# Patient Record
Sex: MALE | Race: OTHER | NOT HISPANIC OR LATINO | URBAN - METROPOLITAN AREA
[De-identification: names, ages, dates, MRNs, and addresses within clinical notes are randomized per-mention and may not be internally consistent; named-entity substitution may affect disease eponyms.]

---

## 2019-03-22 ENCOUNTER — EMERGENCY (EMERGENCY)
Facility: HOSPITAL | Age: 64
LOS: 1 days | Discharge: HOME | End: 2019-03-22
Attending: EMERGENCY MEDICINE | Admitting: EMERGENCY MEDICINE
Payer: MEDICAID

## 2019-03-22 VITALS
SYSTOLIC BLOOD PRESSURE: 153 MMHG | OXYGEN SATURATION: 99 % | RESPIRATION RATE: 16 BRPM | HEART RATE: 98 BPM | DIASTOLIC BLOOD PRESSURE: 71 MMHG | TEMPERATURE: 99 F

## 2019-03-22 VITALS
HEART RATE: 112 BPM | TEMPERATURE: 102 F | SYSTOLIC BLOOD PRESSURE: 120 MMHG | RESPIRATION RATE: 22 BRPM | DIASTOLIC BLOOD PRESSURE: 72 MMHG | OXYGEN SATURATION: 96 %

## 2019-03-22 LAB
ALBUMIN SERPL ELPH-MCNC: 4.3 G/DL — SIGNIFICANT CHANGE UP (ref 3.3–5)
ALP SERPL-CCNC: 112 U/L — SIGNIFICANT CHANGE UP (ref 40–120)
ALT FLD-CCNC: 46 U/L — HIGH (ref 4–41)
ANION GAP SERPL CALC-SCNC: 14 MMO/L — SIGNIFICANT CHANGE UP (ref 7–14)
APPEARANCE UR: SIGNIFICANT CHANGE UP
AST SERPL-CCNC: 36 U/L — SIGNIFICANT CHANGE UP (ref 4–40)
B PERT DNA SPEC QL NAA+PROBE: NOT DETECTED — SIGNIFICANT CHANGE UP
BACTERIA # UR AUTO: SIGNIFICANT CHANGE UP
BASE EXCESS BLDV CALC-SCNC: 3.4 MMOL/L — SIGNIFICANT CHANGE UP
BASOPHILS # BLD AUTO: 0.04 K/UL — SIGNIFICANT CHANGE UP (ref 0–0.2)
BASOPHILS NFR BLD AUTO: 0.6 % — SIGNIFICANT CHANGE UP (ref 0–2)
BILIRUB SERPL-MCNC: 0.5 MG/DL — SIGNIFICANT CHANGE UP (ref 0.2–1.2)
BILIRUB UR-MCNC: NEGATIVE — SIGNIFICANT CHANGE UP
BLOOD GAS VENOUS - CREATININE: 0.73 MG/DL — SIGNIFICANT CHANGE UP (ref 0.5–1.3)
BLOOD UR QL VISUAL: NEGATIVE — SIGNIFICANT CHANGE UP
BUN SERPL-MCNC: 9 MG/DL — SIGNIFICANT CHANGE UP (ref 7–23)
C PNEUM DNA SPEC QL NAA+PROBE: NOT DETECTED — SIGNIFICANT CHANGE UP
CALCIUM SERPL-MCNC: 9.6 MG/DL — SIGNIFICANT CHANGE UP (ref 8.4–10.5)
CHLORIDE BLDV-SCNC: 100 MMOL/L — SIGNIFICANT CHANGE UP (ref 96–108)
CHLORIDE SERPL-SCNC: 99 MMOL/L — SIGNIFICANT CHANGE UP (ref 98–107)
CO2 SERPL-SCNC: 24 MMOL/L — SIGNIFICANT CHANGE UP (ref 22–31)
COLOR SPEC: YELLOW — SIGNIFICANT CHANGE UP
CREAT SERPL-MCNC: 0.75 MG/DL — SIGNIFICANT CHANGE UP (ref 0.5–1.3)
EOSINOPHIL # BLD AUTO: 0.05 K/UL — SIGNIFICANT CHANGE UP (ref 0–0.5)
EOSINOPHIL NFR BLD AUTO: 0.7 % — SIGNIFICANT CHANGE UP (ref 0–6)
EPI CELLS # UR: SIGNIFICANT CHANGE UP
FLUAV H1 2009 PAND RNA SPEC QL NAA+PROBE: NOT DETECTED — SIGNIFICANT CHANGE UP
FLUAV H1 RNA SPEC QL NAA+PROBE: NOT DETECTED — SIGNIFICANT CHANGE UP
FLUAV H3 RNA SPEC QL NAA+PROBE: DETECTED — HIGH
FLUBV RNA SPEC QL NAA+PROBE: NOT DETECTED — SIGNIFICANT CHANGE UP
GAS PNL BLDV: 137 MMOL/L — SIGNIFICANT CHANGE UP (ref 136–146)
GLUCOSE BLDV-MCNC: 273 — HIGH (ref 70–99)
GLUCOSE SERPL-MCNC: 256 MG/DL — HIGH (ref 70–99)
GLUCOSE UR-MCNC: 300 — HIGH
HADV DNA SPEC QL NAA+PROBE: NOT DETECTED — SIGNIFICANT CHANGE UP
HCO3 BLDV-SCNC: 26 MMOL/L — SIGNIFICANT CHANGE UP (ref 20–27)
HCOV PNL SPEC NAA+PROBE: SIGNIFICANT CHANGE UP
HCT VFR BLD CALC: 43.4 % — SIGNIFICANT CHANGE UP (ref 39–50)
HCT VFR BLDV CALC: 44.1 % — SIGNIFICANT CHANGE UP (ref 39–51)
HGB BLD-MCNC: 13.9 G/DL — SIGNIFICANT CHANGE UP (ref 13–17)
HGB BLDV-MCNC: 14.4 G/DL — SIGNIFICANT CHANGE UP (ref 13–17)
HMPV RNA SPEC QL NAA+PROBE: NOT DETECTED — SIGNIFICANT CHANGE UP
HPIV1 RNA SPEC QL NAA+PROBE: NOT DETECTED — SIGNIFICANT CHANGE UP
HPIV2 RNA SPEC QL NAA+PROBE: NOT DETECTED — SIGNIFICANT CHANGE UP
HPIV3 RNA SPEC QL NAA+PROBE: NOT DETECTED — SIGNIFICANT CHANGE UP
HPIV4 RNA SPEC QL NAA+PROBE: NOT DETECTED — SIGNIFICANT CHANGE UP
IMM GRANULOCYTES NFR BLD AUTO: 0.9 % — SIGNIFICANT CHANGE UP (ref 0–1.5)
KETONES UR-MCNC: NEGATIVE — SIGNIFICANT CHANGE UP
LACTATE BLDV-MCNC: 1.8 MMOL/L — SIGNIFICANT CHANGE UP (ref 0.5–2)
LEUKOCYTE ESTERASE UR-ACNC: SIGNIFICANT CHANGE UP
LYMPHOCYTES # BLD AUTO: 0.97 K/UL — LOW (ref 1–3.3)
LYMPHOCYTES # BLD AUTO: 14.1 % — SIGNIFICANT CHANGE UP (ref 13–44)
MAGNESIUM SERPL-MCNC: 1.4 MG/DL — LOW (ref 1.6–2.6)
MCHC RBC-ENTMCNC: 25.5 PG — LOW (ref 27–34)
MCHC RBC-ENTMCNC: 32 % — SIGNIFICANT CHANGE UP (ref 32–36)
MCV RBC AUTO: 79.6 FL — LOW (ref 80–100)
MONOCYTES # BLD AUTO: 0.53 K/UL — SIGNIFICANT CHANGE UP (ref 0–0.9)
MONOCYTES NFR BLD AUTO: 7.7 % — SIGNIFICANT CHANGE UP (ref 2–14)
MUCOUS THREADS # UR AUTO: SIGNIFICANT CHANGE UP
NEUTROPHILS # BLD AUTO: 5.21 K/UL — SIGNIFICANT CHANGE UP (ref 1.8–7.4)
NEUTROPHILS NFR BLD AUTO: 76 % — SIGNIFICANT CHANGE UP (ref 43–77)
NITRITE UR-MCNC: NEGATIVE — SIGNIFICANT CHANGE UP
NRBC # FLD: 0 K/UL — LOW (ref 25–125)
PCO2 BLDV: 48 MMHG — SIGNIFICANT CHANGE UP (ref 41–51)
PH BLDV: 7.38 PH — SIGNIFICANT CHANGE UP (ref 7.32–7.43)
PH UR: 6.5 — SIGNIFICANT CHANGE UP (ref 5–8)
PHOSPHATE SERPL-MCNC: 3.2 MG/DL — SIGNIFICANT CHANGE UP (ref 2.5–4.5)
PLATELET # BLD AUTO: 238 K/UL — SIGNIFICANT CHANGE UP (ref 150–400)
PMV BLD: 11.8 FL — SIGNIFICANT CHANGE UP (ref 7–13)
PO2 BLDV: 30 MMHG — LOW (ref 35–40)
POTASSIUM BLDV-SCNC: 3.7 MMOL/L — SIGNIFICANT CHANGE UP (ref 3.4–4.5)
POTASSIUM SERPL-MCNC: 4 MMOL/L — SIGNIFICANT CHANGE UP (ref 3.5–5.3)
POTASSIUM SERPL-SCNC: 4 MMOL/L — SIGNIFICANT CHANGE UP (ref 3.5–5.3)
PROT SERPL-MCNC: 7.5 G/DL — SIGNIFICANT CHANGE UP (ref 6–8.3)
PROT UR-MCNC: SIGNIFICANT CHANGE UP
RBC # BLD: 5.45 M/UL — SIGNIFICANT CHANGE UP (ref 4.2–5.8)
RBC # FLD: 13.5 % — SIGNIFICANT CHANGE UP (ref 10.3–14.5)
RSV RNA SPEC QL NAA+PROBE: NOT DETECTED — SIGNIFICANT CHANGE UP
RV+EV RNA SPEC QL NAA+PROBE: DETECTED — HIGH
SAO2 % BLDV: 52.4 % — LOW (ref 60–85)
SODIUM SERPL-SCNC: 137 MMOL/L — SIGNIFICANT CHANGE UP (ref 135–145)
SP GR SPEC: 1.02 — SIGNIFICANT CHANGE UP (ref 1–1.04)
UROBILINOGEN FLD QL: NORMAL — SIGNIFICANT CHANGE UP
WBC # BLD: 6.86 K/UL — SIGNIFICANT CHANGE UP (ref 3.8–10.5)
WBC # FLD AUTO: 6.86 K/UL — SIGNIFICANT CHANGE UP (ref 3.8–10.5)
WBC UR QL: SIGNIFICANT CHANGE UP (ref 0–?)

## 2019-03-22 PROCEDURE — 71046 X-RAY EXAM CHEST 2 VIEWS: CPT | Mod: 26

## 2019-03-22 PROCEDURE — 74177 CT ABD & PELVIS W/CONTRAST: CPT | Mod: 26

## 2019-03-22 PROCEDURE — 99284 EMERGENCY DEPT VISIT MOD MDM: CPT

## 2019-03-22 RX ORDER — ACETAMINOPHEN 500 MG
975 TABLET ORAL ONCE
Qty: 0 | Refills: 0 | Status: COMPLETED | OUTPATIENT
Start: 2019-03-22 | End: 2019-03-22

## 2019-03-22 RX ORDER — CEFTRIAXONE 500 MG/1
1 INJECTION, POWDER, FOR SOLUTION INTRAMUSCULAR; INTRAVENOUS ONCE
Qty: 0 | Refills: 0 | Status: COMPLETED | OUTPATIENT
Start: 2019-03-22 | End: 2019-03-22

## 2019-03-22 RX ORDER — SODIUM CHLORIDE 9 MG/ML
2000 INJECTION, SOLUTION INTRAVENOUS ONCE
Qty: 0 | Refills: 0 | Status: COMPLETED | OUTPATIENT
Start: 2019-03-22 | End: 2019-03-22

## 2019-03-22 RX ORDER — AZITHROMYCIN 500 MG/1
500 TABLET, FILM COATED ORAL ONCE
Qty: 0 | Refills: 0 | Status: COMPLETED | OUTPATIENT
Start: 2019-03-22 | End: 2019-03-22

## 2019-03-22 RX ORDER — CLARITHROMYCIN 500 MG
1 TABLET ORAL
Qty: 8 | Refills: 0
Start: 2019-03-22 | End: 2019-03-25

## 2019-03-22 RX ADMIN — AZITHROMYCIN 250 MILLIGRAM(S): 500 TABLET, FILM COATED ORAL at 13:47

## 2019-03-22 RX ADMIN — SODIUM CHLORIDE 2000 MILLILITER(S): 9 INJECTION, SOLUTION INTRAVENOUS at 13:15

## 2019-03-22 RX ADMIN — CEFTRIAXONE 100 GRAM(S): 500 INJECTION, POWDER, FOR SOLUTION INTRAMUSCULAR; INTRAVENOUS at 13:15

## 2019-03-22 RX ADMIN — Medication 975 MILLIGRAM(S): at 14:19

## 2019-03-22 RX ADMIN — Medication 75 MILLIGRAM(S): at 17:49

## 2019-03-22 NOTE — ED PROVIDER NOTE - NSFOLLOWUPINSTRUCTIONS_ED_ALL_ED_FT
- You came to the hospital with fever, tachycardia (fast heart rate) and abdominal pain. You were found to have Influenza A virus. You also have Entero/Rhinovirus which are other respiratory viral infections.   - You had a CT scan of your abdomen and it did not show any concerning findings. You have diverticulosis (pockets in the colon) and you should eat fiber rich foods to avoid further diverticulosis. You have 2 3 mm nodules in both lungs which should be followed up by your primary care doctor.     1) Please take tamiflu (oseltamivir) 75 mg twice daily for 5 days total. This will help with your flu symptoms.  2) Please take azithromycin 250 mg daily for 4 days. You already received a dose of azithromycin in the ED. This is an antibiotic that will treat organisms that can cause pneumonia (although our suspicion for pneumonia is low).   3) Please follow up with your primary care doctor within 7 days of discharge.  4) Please drink plenty of fluids and get adequate rest.

## 2019-03-22 NOTE — ED PROVIDER NOTE - OBJECTIVE STATEMENT
63M with DMII and CAD s/p stent, presenting with subjective fever, nonproductive cough x 5 days, and generalized weakness. Also reporting watery diarrhea ~3 episodes per day for past 5 days, associated with lower abdominal pain. Pt states he just arrived from [place] last night. No sick contacts. No nausea or vomiting. No chest pain or dyspnea. No urinary symptoms. No bleeding in urine or stool. 63M with DMII and CAD s/p stent, presenting with subjective fever, nonproductive cough x 5 days, and generalized weakness. Also reporting watery diarrhea ~3 episodes per day for past 5 days, associated with lower abdominal pain. Pt states he just arrived from [place] last night. No sick contacts. No nausea or vomiting. No chest pain or dyspnea. No urinary symptoms. No bleeding in urine or stool.   Lizbet att: 63M h/o niddm, cad 1 stent p/w f, cough, malaise x 5 days. Patient returned from [place] last night but for past five days notes subj fever, malaise, non productive cough, watery non bloody diarrhea. NOtes some discomfort, right lower abdomen. Denies cp, dyspnea on exertion, dysuria. NKDA

## 2019-03-22 NOTE — ED ADULT NURSE NOTE - NSIMPLEMENTINTERV_GEN_ALL_ED
Implemented All Universal Safety Interventions:  [place] to call system. Call bell, personal items and telephone within reach. Instruct patient to call for assistance. Room bathroom lighting operational. Non-slip footwear when patient is off stretcher. Physically safe environment: no spills, clutter or unnecessary equipment. Stretcher in lowest position, wheels locked, appropriate side rails in place.

## 2019-03-22 NOTE — ED PROVIDER NOTE - CLINICAL SUMMARY MEDICAL DECISION MAKING FREE TEXT BOX
63M DMII and CAD, p/w fever and tachycardia, suspected pulmonary vs. abdominal source, meeting sepsis criteria.  2L IVF bolus, will send CBC, CMP, vbg with lactate, blood and urine cultures, RVP, will treat empirically with ceftriaxone and azithromycin (will cover for CAP as well as possible UTI)  Check CT abdomen/pelvis as pt with RLQ tenderness on exam.

## 2019-03-22 NOTE — ED ADULT NURSE NOTE - OBJECTIVE STATEMENT
Pt returning from [place] last night c/o dry cough x 5 days received to rm 23 aaox4 ambulatory Pt denies chest pain SOB headache dizziness NVD abdominal pain.  Pt p/W NAD breaths even unlabored dry cough intermittent skin warm dry intact fever noted.  18 g IV access obtained at R. labs fluids and abx as ordered will monitor.

## 2019-03-22 NOTE — ED ADULT TRIAGE NOTE - CHIEF COMPLAINT QUOTE
arrived from [place] last night. c/o cough x 5 days no productive sputum, denies chest pain. hx of DM and stent in 2012. no distress at rest. Febrile and tachy.

## 2019-03-23 LAB
BACTERIA UR CULT: SIGNIFICANT CHANGE UP
SPECIMEN SOURCE: SIGNIFICANT CHANGE UP

## 2019-03-27 LAB
BACTERIA BLD CULT: SIGNIFICANT CHANGE UP
BACTERIA BLD CULT: SIGNIFICANT CHANGE UP

## 2019-08-01 ENCOUNTER — OUTPATIENT (OUTPATIENT)
Dept: OUTPATIENT SERVICES | Facility: HOSPITAL | Age: 64
LOS: 1 days | End: 2019-08-01
Payer: MEDICAID

## 2019-08-01 PROCEDURE — G9001: CPT

## 2019-08-05 ENCOUNTER — INPATIENT (INPATIENT)
Facility: HOSPITAL | Age: 64
LOS: 2 days | Discharge: HOME HEALTH | End: 2019-08-08
Attending: INTERNAL MEDICINE | Admitting: INTERNAL MEDICINE
Payer: MEDICAID

## 2019-08-05 VITALS
SYSTOLIC BLOOD PRESSURE: 133 MMHG | RESPIRATION RATE: 16 BRPM | HEART RATE: 76 BPM | DIASTOLIC BLOOD PRESSURE: 77 MMHG | TEMPERATURE: 98 F | OXYGEN SATURATION: 100 %

## 2019-08-05 DIAGNOSIS — R07.9 CHEST PAIN, UNSPECIFIED: ICD-10-CM

## 2019-08-05 LAB
ALBUMIN SERPL ELPH-MCNC: 4.3 G/DL — SIGNIFICANT CHANGE UP (ref 3.3–5)
ALP SERPL-CCNC: 99 U/L — SIGNIFICANT CHANGE UP (ref 40–120)
ALT FLD-CCNC: 40 U/L — SIGNIFICANT CHANGE UP (ref 4–41)
ANION GAP SERPL CALC-SCNC: 11 MMO/L — SIGNIFICANT CHANGE UP (ref 7–14)
APTT BLD: 28.6 SEC — SIGNIFICANT CHANGE UP (ref 27.5–36.3)
AST SERPL-CCNC: 24 U/L — SIGNIFICANT CHANGE UP (ref 4–40)
BASOPHILS # BLD AUTO: 0.09 K/UL — SIGNIFICANT CHANGE UP (ref 0–0.2)
BASOPHILS NFR BLD AUTO: 1 % — SIGNIFICANT CHANGE UP (ref 0–2)
BILIRUB SERPL-MCNC: 0.3 MG/DL — SIGNIFICANT CHANGE UP (ref 0.2–1.2)
BUN SERPL-MCNC: 18 MG/DL — SIGNIFICANT CHANGE UP (ref 7–23)
CALCIUM SERPL-MCNC: 9.8 MG/DL — SIGNIFICANT CHANGE UP (ref 8.4–10.5)
CHLORIDE SERPL-SCNC: 98 MMOL/L — SIGNIFICANT CHANGE UP (ref 98–107)
CO2 SERPL-SCNC: 26 MMOL/L — SIGNIFICANT CHANGE UP (ref 22–31)
CREAT SERPL-MCNC: 0.73 MG/DL — SIGNIFICANT CHANGE UP (ref 0.5–1.3)
EOSINOPHIL # BLD AUTO: 0.21 K/UL — SIGNIFICANT CHANGE UP (ref 0–0.5)
EOSINOPHIL NFR BLD AUTO: 2.3 % — SIGNIFICANT CHANGE UP (ref 0–6)
GLUCOSE SERPL-MCNC: 291 MG/DL — HIGH (ref 70–99)
HCT VFR BLD CALC: 41.5 % — SIGNIFICANT CHANGE UP (ref 39–50)
HGB BLD-MCNC: 13.4 G/DL — SIGNIFICANT CHANGE UP (ref 13–17)
IMM GRANULOCYTES NFR BLD AUTO: 1.2 % — SIGNIFICANT CHANGE UP (ref 0–1.5)
INR BLD: 1.03 — SIGNIFICANT CHANGE UP (ref 0.88–1.17)
LYMPHOCYTES # BLD AUTO: 3.03 K/UL — SIGNIFICANT CHANGE UP (ref 1–3.3)
LYMPHOCYTES # BLD AUTO: 33 % — SIGNIFICANT CHANGE UP (ref 13–44)
MCHC RBC-ENTMCNC: 25.7 PG — LOW (ref 27–34)
MCHC RBC-ENTMCNC: 32.3 % — SIGNIFICANT CHANGE UP (ref 32–36)
MCV RBC AUTO: 79.5 FL — LOW (ref 80–100)
MONOCYTES # BLD AUTO: 0.63 K/UL — SIGNIFICANT CHANGE UP (ref 0–0.9)
MONOCYTES NFR BLD AUTO: 6.9 % — SIGNIFICANT CHANGE UP (ref 2–14)
NEUTROPHILS # BLD AUTO: 5.1 K/UL — SIGNIFICANT CHANGE UP (ref 1.8–7.4)
NEUTROPHILS NFR BLD AUTO: 55.6 % — SIGNIFICANT CHANGE UP (ref 43–77)
NRBC # FLD: 0 K/UL — SIGNIFICANT CHANGE UP (ref 0–0)
PLATELET # BLD AUTO: 224 K/UL — SIGNIFICANT CHANGE UP (ref 150–400)
PMV BLD: 10.7 FL — SIGNIFICANT CHANGE UP (ref 7–13)
POTASSIUM SERPL-MCNC: 4.5 MMOL/L — SIGNIFICANT CHANGE UP (ref 3.5–5.3)
POTASSIUM SERPL-SCNC: 4.5 MMOL/L — SIGNIFICANT CHANGE UP (ref 3.5–5.3)
PROT SERPL-MCNC: 7.5 G/DL — SIGNIFICANT CHANGE UP (ref 6–8.3)
PROTHROM AB SERPL-ACNC: 11.8 SEC — SIGNIFICANT CHANGE UP (ref 9.8–13.1)
RBC # BLD: 5.22 M/UL — SIGNIFICANT CHANGE UP (ref 4.2–5.8)
RBC # FLD: 13.4 % — SIGNIFICANT CHANGE UP (ref 10.3–14.5)
SODIUM SERPL-SCNC: 135 MMOL/L — SIGNIFICANT CHANGE UP (ref 135–145)
TROPONIN T, HIGH SENSITIVITY: 7 NG/L — SIGNIFICANT CHANGE UP (ref ?–14)
TROPONIN T, HIGH SENSITIVITY: 8 NG/L — SIGNIFICANT CHANGE UP (ref ?–14)
WBC # BLD: 9.17 K/UL — SIGNIFICANT CHANGE UP (ref 3.8–10.5)
WBC # FLD AUTO: 9.17 K/UL — SIGNIFICANT CHANGE UP (ref 3.8–10.5)

## 2019-08-05 PROCEDURE — 71046 X-RAY EXAM CHEST 2 VIEWS: CPT | Mod: 26

## 2019-08-05 RX ORDER — ASPIRIN/CALCIUM CARB/MAGNESIUM 324 MG
325 TABLET ORAL ONCE
Refills: 0 | Status: COMPLETED | OUTPATIENT
Start: 2019-08-05 | End: 2019-08-05

## 2019-08-05 RX ORDER — NITROGLYCERIN 6.5 MG
0.4 CAPSULE, EXTENDED RELEASE ORAL ONCE
Refills: 0 | Status: COMPLETED | OUTPATIENT
Start: 2019-08-05 | End: 2019-08-05

## 2019-08-05 RX ADMIN — Medication 0.4 MILLIGRAM(S): at 21:39

## 2019-08-05 RX ADMIN — Medication 325 MILLIGRAM(S): at 19:59

## 2019-08-05 NOTE — ED ADULT NURSE NOTE - OBJECTIVE STATEMENT
64 year old male with PMH of cardiac stents on Plavix presents with chest pain x 5 hours   EKG done, PIV placed labs drawn and sent plan of care discussed with pt and daughter   will continue to monitor

## 2019-08-05 NOTE — ED ADULT NURSE NOTE - NSIMPLEMENTINTERV_GEN_ALL_ED
Implemented All Fall Risk Interventions:  [place] to call system. Call bell, personal items and telephone within reach. Instruct patient to call for assistance. Room bathroom lighting operational. Non-slip footwear when patient is off stretcher. Physically safe environment: no spills, clutter or unnecessary equipment. Stretcher in lowest position, wheels locked, appropriate side rails in place. Provide visual cue, wrist band, yellow gown, etc. Monitor gait and stability. Monitor for mental status changes and reorient to person, place, and time. Review medications for side effects contributing to fall risk. Reinforce activity limits and safety measures with patient and family.

## 2019-08-05 NOTE — ED PROVIDER NOTE - CLINICAL SUMMARY MEDICAL DECISION MAKING FREE TEXT BOX
pt w/ pmh DMII , CAD s/p stent w/ sudd onset CP waking from sleep left-sided lasting 3-4 hours ekg abnormal tropes 8 will rpt ekg, rpt tropes will contact pts, admit for high risk CP.

## 2019-08-05 NOTE — ED ADULT NURSE REASSESSMENT NOTE - NS ED NURSE REASSESS COMMENT FT1
Handoff report received from PONCHO Oliva. Pt resting on stretcher, c/o of intermittent chest pain to left side of chest. MD made aware. Pt denies SOB, n/v/d, abdominal pain, fever, chills. Respirations even and unlabored, NAD, sinus rhythm on cardiac monitor. Trop repeated and sent. Will continue to monitor

## 2019-08-05 NOTE — ED PROVIDER NOTE - NS ED ROS FT
Gen: Denies fever, weight loss  CV: Denies endorses chest pain, no palpitations   Skin: Denies rash, erythema, color changes  Resp: Denies SOB, cough  Endo: Denies sensitivity to heat, cold, increased urination  GI: Denies constipation, nausea, vomiting  Msk: Denies back pain, LE swelling, extremity pain  : Denies dysuria, increased frequency  Neuro: Denies LOC, weakness, seizures  Psych: Denies hx of psych, hallucinations

## 2019-08-05 NOTE — ED PROVIDER NOTE - PHYSICAL EXAMINATION
Gen: WDWN, NAD  HEENT: EOMI, no nasal discharge, mucous membranes moist  CV: RRR, +S1/S2, no M/R/G  Resp: CTAB, no W/R/R  GI: Abdomen soft non-distended, NTTP, no masses  MSK: No open wounds, no bruising, no LE edema  Neuro: A&Ox4, following commands, moving all four extremities spontaneously  Psych: appropriate mood, denies AH, VH, SI

## 2019-08-05 NOTE — ED PROVIDER NOTE - ATTENDING CONTRIBUTION TO CARE
I performed a face to face bedside interview with patient regarding history of present illness, review of symptoms and past medical history. I completed an independent physical exam.  I have discussed patient's plan of care.   I agree with note as stated above, having amended the EMR as needed to reflect my findings. I have discussed the assessment and plan of care.  This includes during the time I functioned as the attending physician for this patient.  Attending Contribution to Care: agree with plan of resident.  DMII and CAD s/p stent here for chest pain. Reports pain woke pt this afternoon approx 5 hours ago, left sided non radiating non-exertional sudden onset burning pain lasted approx 3-4 hours, now resolved. pmh DMII , CAD s/p stent w/ sudd onset CP waking from sleep left-sided lasting 3-4 hours ekg abnormal tropes 8 will rpt ekg, rpt tropes will contact pts, admit for high risk CP. no need for urgent cath at this point.

## 2019-08-05 NOTE — ED PROVIDER NOTE - OBJECTIVE STATEMENT
64M w/ PMH DMII and CAD s/p stent here for chest pain. Reports pain woke pt this afternoon approx 5 hours ago, left sided non radiating burning pain lasted approx 3-4 hours, now resolved. No dyspnea, no SOB, no abdominal pain, no diaphoresis 64M w/ PMH DMII and CAD s/p stent here for chest pain. Reports pain woke pt this afternoon approx 5 hours ago, left sided non radiating non-exertional sudden onset burning pain lasted approx 3-4 hours, now resolved. No dyspnea, no SOB, no abdominal pain, no diaphoresis, no meds taken at home.     No fever, sweats, recent weight loss, Abd pain, no drug use, fam hx of CAD.

## 2019-08-06 ENCOUNTER — TRANSCRIPTION ENCOUNTER (OUTPATIENT)
Age: 64
End: 2019-08-06

## 2019-08-06 DIAGNOSIS — I10 ESSENTIAL (PRIMARY) HYPERTENSION: ICD-10-CM

## 2019-08-06 DIAGNOSIS — Z29.9 ENCOUNTER FOR PROPHYLACTIC MEASURES, UNSPECIFIED: ICD-10-CM

## 2019-08-06 DIAGNOSIS — I25.10 ATHEROSCLEROTIC HEART DISEASE OF NATIVE CORONARY ARTERY WITHOUT ANGINA PECTORIS: ICD-10-CM

## 2019-08-06 DIAGNOSIS — E11.9 TYPE 2 DIABETES MELLITUS WITHOUT COMPLICATIONS: ICD-10-CM

## 2019-08-06 DIAGNOSIS — R07.9 CHEST PAIN, UNSPECIFIED: ICD-10-CM

## 2019-08-06 LAB
CK MB BLD-MCNC: 1.92 NG/ML — SIGNIFICANT CHANGE UP (ref 1–6.6)
CK SERPL-CCNC: 90 U/L — SIGNIFICANT CHANGE UP (ref 30–200)
GLUCOSE BLDC GLUCOMTR-MCNC: 258 MG/DL — HIGH (ref 70–99)
GLUCOSE BLDC GLUCOMTR-MCNC: 274 MG/DL — HIGH (ref 70–99)
GLUCOSE BLDC GLUCOMTR-MCNC: 277 MG/DL — HIGH (ref 70–99)
GLUCOSE BLDC GLUCOMTR-MCNC: 332 MG/DL — HIGH (ref 70–99)
HBA1C BLD-MCNC: 13.4 % — HIGH (ref 4–5.6)
HCT VFR BLD CALC: 41.6 % — SIGNIFICANT CHANGE UP (ref 39–50)
HCV AB S/CO SERPL IA: 0.08 S/CO — SIGNIFICANT CHANGE UP (ref 0–0.99)
HCV AB SERPL-IMP: SIGNIFICANT CHANGE UP
HGB BLD-MCNC: 13.5 G/DL — SIGNIFICANT CHANGE UP (ref 13–17)
MCHC RBC-ENTMCNC: 25.9 PG — LOW (ref 27–34)
MCHC RBC-ENTMCNC: 32.5 % — SIGNIFICANT CHANGE UP (ref 32–36)
MCV RBC AUTO: 79.8 FL — LOW (ref 80–100)
NRBC # FLD: 0 K/UL — SIGNIFICANT CHANGE UP (ref 0–0)
PLATELET # BLD AUTO: 215 K/UL — SIGNIFICANT CHANGE UP (ref 150–400)
PMV BLD: 10.9 FL — SIGNIFICANT CHANGE UP (ref 7–13)
RBC # BLD: 5.21 M/UL — SIGNIFICANT CHANGE UP (ref 4.2–5.8)
RBC # FLD: 13.3 % — SIGNIFICANT CHANGE UP (ref 10.3–14.5)
TROPONIN T, HIGH SENSITIVITY: 8 NG/L — SIGNIFICANT CHANGE UP (ref ?–14)
WBC # BLD: 7.86 K/UL — SIGNIFICANT CHANGE UP (ref 3.8–10.5)
WBC # FLD AUTO: 7.86 K/UL — SIGNIFICANT CHANGE UP (ref 3.8–10.5)

## 2019-08-06 PROCEDURE — 78452 HT MUSCLE IMAGE SPECT MULT: CPT | Mod: 26

## 2019-08-06 PROCEDURE — 93016 CV STRESS TEST SUPVJ ONLY: CPT | Mod: GC

## 2019-08-06 PROCEDURE — 99223 1ST HOSP IP/OBS HIGH 75: CPT

## 2019-08-06 PROCEDURE — 93018 CV STRESS TEST I&R ONLY: CPT | Mod: GC

## 2019-08-06 PROCEDURE — 93306 TTE W/DOPPLER COMPLETE: CPT | Mod: 26

## 2019-08-06 RX ORDER — GLUCAGON INJECTION, SOLUTION 0.5 MG/.1ML
1 INJECTION, SOLUTION SUBCUTANEOUS ONCE
Refills: 0 | Status: DISCONTINUED | OUTPATIENT
Start: 2019-08-06 | End: 2019-08-08

## 2019-08-06 RX ORDER — SODIUM CHLORIDE 9 MG/ML
1000 INJECTION, SOLUTION INTRAVENOUS
Refills: 0 | Status: DISCONTINUED | OUTPATIENT
Start: 2019-08-06 | End: 2019-08-08

## 2019-08-06 RX ORDER — DEXTROSE 50 % IN WATER 50 %
15 SYRINGE (ML) INTRAVENOUS ONCE
Refills: 0 | Status: DISCONTINUED | OUTPATIENT
Start: 2019-08-06 | End: 2019-08-07

## 2019-08-06 RX ORDER — CLOPIDOGREL BISULFATE 75 MG/1
81 TABLET, FILM COATED ORAL
Qty: 0 | Refills: 0 | DISCHARGE

## 2019-08-06 RX ORDER — LOSARTAN POTASSIUM 100 MG/1
25 TABLET, FILM COATED ORAL DAILY
Refills: 0 | Status: DISCONTINUED | OUTPATIENT
Start: 2019-08-06 | End: 2019-08-08

## 2019-08-06 RX ORDER — INSULIN LISPRO 100/ML
VIAL (ML) SUBCUTANEOUS AT BEDTIME
Refills: 0 | Status: DISCONTINUED | OUTPATIENT
Start: 2019-08-06 | End: 2019-08-07

## 2019-08-06 RX ORDER — DEXTROSE 50 % IN WATER 50 %
25 SYRINGE (ML) INTRAVENOUS ONCE
Refills: 0 | Status: DISCONTINUED | OUTPATIENT
Start: 2019-08-06 | End: 2019-08-07

## 2019-08-06 RX ORDER — DEXTROSE 50 % IN WATER 50 %
12.5 SYRINGE (ML) INTRAVENOUS ONCE
Refills: 0 | Status: DISCONTINUED | OUTPATIENT
Start: 2019-08-06 | End: 2019-08-07

## 2019-08-06 RX ORDER — CLOPIDOGREL BISULFATE 75 MG/1
75 TABLET, FILM COATED ORAL DAILY
Refills: 0 | Status: DISCONTINUED | OUTPATIENT
Start: 2019-08-06 | End: 2019-08-08

## 2019-08-06 RX ORDER — ASPIRIN/CALCIUM CARB/MAGNESIUM 324 MG
81 TABLET ORAL DAILY
Refills: 0 | Status: DISCONTINUED | OUTPATIENT
Start: 2019-08-06 | End: 2019-08-08

## 2019-08-06 RX ORDER — INSULIN LISPRO 100/ML
VIAL (ML) SUBCUTANEOUS
Refills: 0 | Status: DISCONTINUED | OUTPATIENT
Start: 2019-08-06 | End: 2019-08-07

## 2019-08-06 RX ORDER — ATORVASTATIN CALCIUM 80 MG/1
80 TABLET, FILM COATED ORAL AT BEDTIME
Refills: 0 | Status: DISCONTINUED | OUTPATIENT
Start: 2019-08-06 | End: 2019-08-08

## 2019-08-06 RX ORDER — METOPROLOL TARTRATE 50 MG
25 TABLET ORAL
Refills: 0 | Status: DISCONTINUED | OUTPATIENT
Start: 2019-08-06 | End: 2019-08-08

## 2019-08-06 RX ADMIN — ATORVASTATIN CALCIUM 80 MILLIGRAM(S): 80 TABLET, FILM COATED ORAL at 06:29

## 2019-08-06 RX ADMIN — Medication 25 MILLIGRAM(S): at 06:29

## 2019-08-06 RX ADMIN — Medication 25 MILLIGRAM(S): at 17:37

## 2019-08-06 RX ADMIN — CLOPIDOGREL BISULFATE 75 MILLIGRAM(S): 75 TABLET, FILM COATED ORAL at 11:03

## 2019-08-06 RX ADMIN — Medication 3: at 13:47

## 2019-08-06 RX ADMIN — Medication 3: at 09:15

## 2019-08-06 RX ADMIN — ATORVASTATIN CALCIUM 80 MILLIGRAM(S): 80 TABLET, FILM COATED ORAL at 22:03

## 2019-08-06 RX ADMIN — Medication 3: at 18:16

## 2019-08-06 RX ADMIN — Medication 81 MILLIGRAM(S): at 11:03

## 2019-08-06 RX ADMIN — Medication 2: at 22:03

## 2019-08-06 RX ADMIN — LOSARTAN POTASSIUM 25 MILLIGRAM(S): 100 TABLET, FILM COATED ORAL at 06:29

## 2019-08-06 NOTE — H&P ADULT - NSHPLABSRESULTS_GEN_ALL_CORE
(08-05 @ 19:20)                      13.4  9.17 )-----------( 224                 41.5    Neutrophils = 5.10 (55.6%)  Lymphocytes = 3.03 (33.0%)  Eosinophils = 0.21 (2.3%)  Basophils = 0.09 (1.0%)  Monocytes = 0.63 (6.9%)  Bands = --%    08-05    135  |  98  |  18  ----------------------------<  291<H>  4.5   |  26  |  0.73    Ca    9.8      05 Aug 2019 19:20    TPro  7.5  /  Alb  4.3  /  TBili  0.3  /  DBili  x   /  AST  24  /  ALT  40  /  AlkPhos  99  08-05    ( 05 Aug 2019 19:20 )   PT: 11.8 SEC;   INR: 1.03 ;       PTT:28.6 SEC    Trops: 8 ->7  Labs reviewed  Imaging reviewed  EKG with RBBB, T wave inversions v1-v4, no prior EKG for comparison      INTERPRETATION:  No focal consolidations.      < end of copied text >

## 2019-08-06 NOTE — H&P ADULT - PROBLEM SELECTOR PLAN 1
-Pt with atypical burning chest pain lasting hours which appears to be similar to prior episodes of chest pain. Likely stable angina. EKG with nonspecific t wave inversions and RBBB, no old EKGs available for comparison. Trops negative  -Continue to trend Jil. EKG in AM  -Will likely require stress testing in AM vs cath. Follow up cardiology recs  -Follow up TTE

## 2019-08-06 NOTE — DISCHARGE NOTE PROVIDER - HOSPITAL COURSE
64M w/ PMH DM II and CAD s/p stent 2006 presents with chest pain. Pt reports chest pain which woke pt up from sleep this afternoon. Describes left sided substernal non radiating non-exertional sudden onset burning pain lasting until presentation. Reports some relief with aspirin and nitroglycerin.         Hospital Course:     1: Chest pain: Trop 8- 7- 8. Cardio Dr. Damico: recommend echo and NST ordered        2. CAD (coronary artery disease).  Plan: Plan:  -s/p stent 13 years ago. Continue with beta blocker, losartan, aspirin, plavix, and high dose statin. Pt unclear about what medications he is taking, will need full med rec in AM.         3. Hypertension.  Plan: Continue with metoprolol and losartan. Monitor blood pressure.         4.  Type 2 diabetes mellitus without complication, without long-term current use of insulin.  Plan: on oral hypoglycemics, hold inpatient. Continue with ISS. F/u hga1c, if FS remain elevated, should start on basal bolus. 64M w/ PMH DM II and CAD s/p stent 2006 presents with chest pain. Pt reports chest pain which woke pt up from sleep this afternoon. Describes left sided substernal non radiating non-exertional sudden onset burning pain lasting until presentation. Reports some relief with aspirin and nitroglycerin.         Hospital Course:     1: Chest pain: Trop 8- 7- 8. Cardio Dr. Damico: recommend echo and NST ordered        2. CAD (coronary artery disease).  Plan: Plan:  -s/p stent 13 years ago. Continue with beta blocker, losartan, aspirin, plavix, and high dose statin. Pt unclear about what medications he is taking, will need full med rec in AM.         3. Hypertension.  Plan: Continue with metoprolol and losartan. Monitor blood pressure.         4.  Type 2 diabetes mellitus without complication, without long-term current use of insulin.  Plan: on oral hypoglycemics, hold inpatient. Continue with ISS. F/u hga1c, if FS remain elevated, should start on basal bolus.     Reviewed discharge medications with patient; All new medications requiring new prescription sent to pharmacy of patients choice. Reviewed need for prescription for previous home medicaitons and new prescriptions sent if requested. Patient in agreement and understands.    Patient is hemodynamically stable and without complaints and patient is ready for discharge.  Case discussed and medications reviewed with PMD.  Agreed with above.

## 2019-08-06 NOTE — DISCHARGE NOTE PROVIDER - NSDCFUSCHEDAPPT_GEN_ALL_CORE_FT
NORMA HAZEL ; 08/28/2019 ; [facility] [address] [facility]  NORMA HAZEL ; 10/22/2019 ; [facility] [address] [facility] NORMA HAZEL ; 08/28/2019 ; [facility] [address] [facility]  NORMA HAZEL ; 10/22/2019 ; [facility] [address] [facility]

## 2019-08-06 NOTE — DISCHARGE NOTE PROVIDER - NSFOLLOWUPCLINICS_GEN_ALL_ED_FT
[facility]  Endocrinology  [address] [place] [id number]  Phone: [phone]  Fax:   Follow Up Time: 4-6 Days    [facility]  Cardiology  [address] [place] [address]  Phone: [phone]  Fax:   Follow Up Time: 4-6 Days

## 2019-08-06 NOTE — H&P ADULT - PROBLEM SELECTOR PLAN 4
-on oral hypoglycemics, hold inpatient  -continue with ISS. F/u hga1c, if FS remain elevated, should start on basal bolus

## 2019-08-06 NOTE — H&P ADULT - HISTORY OF PRESENT ILLNESS
64M w/ PMH DMII and CAD s/p stent 2006 presents with chest pain. Pt reports chest pain pain woke pt up from sleep this afternoon. Describes left sided substernal non radiating non-exertional sudden onset burning pain lasting until presentation. Reports some relief with aspirin and nitroglycerin. Denies associated dyspnea, diaphoresis, N/V, Diarrhea or dyspepsia. States he has had similar episodes of chest pain for the past few months, he will usually take an aspirin or plavix with some relief. Denies recent stress testing. No fever, sweats, recent weight loss, Abd pain, no drug use, fam hx of CAD.  In the ED, vital Tmax: 98.8 HR: 73 - 76 BP: 121/69 - 165/72 RR: 16-18 SpO2: 98% - 100%.Trops negative, EKG with t wave inversions in leads v1-v4, no ST elevations or depressions. Pt was treated with aspirin and nitroglycerin 64M w/ PMH DMII and CAD s/p stent 2006 presents with chest pain. Pt reports chest pain which woke pt up from sleep this afternoon. Describes left sided substernal non radiating non-exertional sudden onset burning pain lasting until presentation. Reports some relief with aspirin and nitroglycerin. Denies associated dyspnea, diaphoresis, N/V, Diarrhea or dyspepsia. States he has had similar episodes of chest pain for the past few months, he will usually take an aspirin or plavix with some relief. Did not take anything at home this time. Denies recent stress testing. No fever, sweats, recent weight loss, Abd pain, no drug use, fam hx of CAD.  In the ED, vital Tmax: 98.8 HR: 73 - 76 BP: 121/69 - 165/72 RR: 16-18 SpO2: 98% - 100%.Trops negative, EKG with t wave inversions in leads v1-v4, no ST elevations or depressions. Pt was treated with aspirin and nitroglycerin

## 2019-08-06 NOTE — DISCHARGE NOTE PROVIDER - NSDCCPCAREPLAN_GEN_ALL_CORE_FT
PRINCIPAL DISCHARGE DIAGNOSIS  Diagnosis: Chest pain  Assessment and Plan of Treatment: PRINCIPAL DISCHARGE DIAGNOSIS  Diagnosis: Chest pain  Assessment and Plan of Treatment: To be asymptomatic, to reduce risks factors such as hypertension, diabetes and hyperlipidemia to lower the risk of blood clots formation; and to prevent complications of coronary artery disease such as worsening chest pain, heart attack and death.   Continue aspirin and Plavix, do not stop unless instructed by your physician.  Continue low salt, fat, cholesterol and carbohydrate low salt &  low cholesterol. Follow up with cardiologist and primary care physician's routine appointment.      SECONDARY DISCHARGE DIAGNOSES  Diagnosis: Diabetes type 2, uncontrolled  Assessment and Plan of Treatment: To maintain a normal blood glucose level and HgA1C level < 5.7 and to prevent diabetic complications such as uncontrolled diabetes, diabetic coma, blindness, no protein restriction, no conc Potassium, No conc phosphate failure, and amputations.   Hypoglycemia management: please check your fingerstick every morning or if you are not feeling well. If your FS is >300mg/dl X3 or more readings please contact your PMD/Endocrinologist. If your FS is low <70mg/dl and/or you have symptoms of very low blood sugar FIRST drink1/2 cup of apple juice (or take 4 glucose tab/tube of glucose gel) and recheck FS in 15mins. Repeat these steps until blood sugar is above 100mg/dl, if NECESSARY. Then call your provider to discuss low blood sugar.   What to expend as tolerated and with assistance followup appointment: please bring a log of your fingerstick and/or your glucometer to you appointment. Your blood sugar tracking will help your diabetes team determine the best plan

## 2019-08-06 NOTE — H&P ADULT - NSHPREVIEWOFSYSTEMS_GEN_ALL_CORE
CONSTITUTIONAL:  No weight loss, fever, chills, weakness or fatigue.  HEENT:  Eyes:  No visual loss, blurred vision, double vision or yellow sclerae. Ears, Nose, Throat:  No hearing loss, sneezing, congestion, runny nose or sore throat.  SKIN:  No rash or itching.  CARDIOVASCULAR: +chest pain/burning. No palpitations or edema.  RESPIRATORY:  No shortness of breath, cough or sputum.  GASTROINTESTINAL:  No anorexia, nausea, vomiting or diarrhea. No abdominal pain or blood.  GENITOURINARY:  Denies hematuria, dysuria.   NEUROLOGICAL:  No headache, dizziness, syncope, paralysis, ataxia, numbness or tingling in the extremities. No change in bowel or bladder control.  MUSCULOSKELETAL:  No muscle, back pain, joint pain or stiffness.  PSYCHIATRIC:  No history of depression or anxiety.

## 2019-08-06 NOTE — H&P ADULT - PROBLEM SELECTOR PLAN 2
-s/p stent 13 years ago  -continue with beta blocker, losartan, aspirin, plavix, and high dose statin  -Pt unclear about what medications he is taking, will need full med rec in AM

## 2019-08-06 NOTE — H&P ADULT - NSHPPHYSICALEXAM_GEN_ALL_CORE
GENERAL APPEARANCE: Well developed, well nourished, alert and cooperative. NAD.   HEENT:  PERRL, EOMI. External auditory canals normal, hearing grossly intact.  NECK: Neck supple, non-tender without lymphadenopathy, masses or thyromegaly.  CARDIAC: Normal S1 and S2. No S3, S4 or murmurs. Rhythm is regular.  LUNGS: Clear to auscultation and percussion without rales, rhonchi, wheezing or diminished breath sounds.  ABDOMEN: Positive bowel sounds. Soft, nondistended, nontender. No guarding or rebound.   MUSKULOSKELETAL: ROM extremities. No joint erythema or tenderness.   BACK: normal posture, no spinal deformity, symmetry of spinal muscles, without tenderness, decreased range of motion.  EXTREMITIES: No significant deformity or joint abnormality. No edema. Peripheral pulses intact. No varicosities.  NEUROLOGICAL: CN II-XII intact. Strength and sensation symmetric and intact throughout.   SKIN: Skin normal color, texture and turgor with no lesions or eruptions.  PSYCHIATRIC: AOx3.Normal affect and behavior.

## 2019-08-07 DIAGNOSIS — E11.65 TYPE 2 DIABETES MELLITUS WITH HYPERGLYCEMIA: ICD-10-CM

## 2019-08-07 DIAGNOSIS — E78.5 HYPERLIPIDEMIA, UNSPECIFIED: ICD-10-CM

## 2019-08-07 DIAGNOSIS — Z71.89 OTHER SPECIFIED COUNSELING: ICD-10-CM

## 2019-08-07 LAB
ANION GAP SERPL CALC-SCNC: 15 MMO/L — HIGH (ref 7–14)
BASOPHILS # BLD AUTO: 0.07 K/UL — SIGNIFICANT CHANGE UP (ref 0–0.2)
BASOPHILS NFR BLD AUTO: 0.8 % — SIGNIFICANT CHANGE UP (ref 0–2)
BUN SERPL-MCNC: 18 MG/DL — SIGNIFICANT CHANGE UP (ref 7–23)
CALCIUM SERPL-MCNC: 9.6 MG/DL — SIGNIFICANT CHANGE UP (ref 8.4–10.5)
CHLORIDE SERPL-SCNC: 99 MMOL/L — SIGNIFICANT CHANGE UP (ref 98–107)
CO2 SERPL-SCNC: 23 MMOL/L — SIGNIFICANT CHANGE UP (ref 22–31)
CREAT SERPL-MCNC: 0.62 MG/DL — SIGNIFICANT CHANGE UP (ref 0.5–1.3)
EOSINOPHIL # BLD AUTO: 0.18 K/UL — SIGNIFICANT CHANGE UP (ref 0–0.5)
EOSINOPHIL NFR BLD AUTO: 2.1 % — SIGNIFICANT CHANGE UP (ref 0–6)
GLUCOSE BLDC GLUCOMTR-MCNC: 262 MG/DL — HIGH (ref 70–99)
GLUCOSE BLDC GLUCOMTR-MCNC: 290 MG/DL — HIGH (ref 70–99)
GLUCOSE BLDC GLUCOMTR-MCNC: 326 MG/DL — HIGH (ref 70–99)
GLUCOSE BLDC GLUCOMTR-MCNC: 375 MG/DL — HIGH (ref 70–99)
GLUCOSE SERPL-MCNC: 276 MG/DL — HIGH (ref 70–99)
HCT VFR BLD CALC: 43.7 % — SIGNIFICANT CHANGE UP (ref 39–50)
HGB BLD-MCNC: 14.1 G/DL — SIGNIFICANT CHANGE UP (ref 13–17)
IMM GRANULOCYTES NFR BLD AUTO: 1.7 % — HIGH (ref 0–1.5)
LYMPHOCYTES # BLD AUTO: 2.35 K/UL — SIGNIFICANT CHANGE UP (ref 1–3.3)
LYMPHOCYTES # BLD AUTO: 27.8 % — SIGNIFICANT CHANGE UP (ref 13–44)
MAGNESIUM SERPL-MCNC: 1.8 MG/DL — SIGNIFICANT CHANGE UP (ref 1.6–2.6)
MCHC RBC-ENTMCNC: 25.6 PG — LOW (ref 27–34)
MCHC RBC-ENTMCNC: 32.3 % — SIGNIFICANT CHANGE UP (ref 32–36)
MCV RBC AUTO: 79.5 FL — LOW (ref 80–100)
MONOCYTES # BLD AUTO: 0.48 K/UL — SIGNIFICANT CHANGE UP (ref 0–0.9)
MONOCYTES NFR BLD AUTO: 5.7 % — SIGNIFICANT CHANGE UP (ref 2–14)
NEUTROPHILS # BLD AUTO: 5.23 K/UL — SIGNIFICANT CHANGE UP (ref 1.8–7.4)
NEUTROPHILS NFR BLD AUTO: 61.9 % — SIGNIFICANT CHANGE UP (ref 43–77)
NRBC # FLD: 0 K/UL — SIGNIFICANT CHANGE UP (ref 0–0)
PHOSPHATE SERPL-MCNC: 3.1 MG/DL — SIGNIFICANT CHANGE UP (ref 2.5–4.5)
PLATELET # BLD AUTO: 233 K/UL — SIGNIFICANT CHANGE UP (ref 150–400)
PMV BLD: 11 FL — SIGNIFICANT CHANGE UP (ref 7–13)
POTASSIUM SERPL-MCNC: 4.5 MMOL/L — SIGNIFICANT CHANGE UP (ref 3.5–5.3)
POTASSIUM SERPL-SCNC: 4.5 MMOL/L — SIGNIFICANT CHANGE UP (ref 3.5–5.3)
RBC # BLD: 5.5 M/UL — SIGNIFICANT CHANGE UP (ref 4.2–5.8)
RBC # FLD: 13.6 % — SIGNIFICANT CHANGE UP (ref 10.3–14.5)
SODIUM SERPL-SCNC: 137 MMOL/L — SIGNIFICANT CHANGE UP (ref 135–145)
WBC # BLD: 8.45 K/UL — SIGNIFICANT CHANGE UP (ref 3.8–10.5)
WBC # FLD AUTO: 8.45 K/UL — SIGNIFICANT CHANGE UP (ref 3.8–10.5)

## 2019-08-07 PROCEDURE — 99232 SBSQ HOSP IP/OBS MODERATE 35: CPT

## 2019-08-07 PROCEDURE — 99223 1ST HOSP IP/OBS HIGH 75: CPT

## 2019-08-07 RX ORDER — INSULIN GLARGINE 100 [IU]/ML
15 INJECTION, SOLUTION SUBCUTANEOUS AT BEDTIME
Refills: 0 | Status: DISCONTINUED | OUTPATIENT
Start: 2019-08-07 | End: 2019-08-08

## 2019-08-07 RX ORDER — DEXTROSE 50 % IN WATER 50 %
25 SYRINGE (ML) INTRAVENOUS ONCE
Refills: 0 | Status: DISCONTINUED | OUTPATIENT
Start: 2019-08-07 | End: 2019-08-08

## 2019-08-07 RX ORDER — INSULIN LISPRO 100/ML
5 VIAL (ML) SUBCUTANEOUS
Qty: 5 | Refills: 0
Start: 2019-08-07 | End: 2019-09-05

## 2019-08-07 RX ORDER — DEXTROSE 50 % IN WATER 50 %
15 SYRINGE (ML) INTRAVENOUS ONCE
Refills: 0 | Status: DISCONTINUED | OUTPATIENT
Start: 2019-08-07 | End: 2019-08-08

## 2019-08-07 RX ORDER — INSULIN LISPRO 100/ML
VIAL (ML) SUBCUTANEOUS
Refills: 0 | Status: DISCONTINUED | OUTPATIENT
Start: 2019-08-07 | End: 2019-08-08

## 2019-08-07 RX ORDER — INSULIN LISPRO 100/ML
5 VIAL (ML) SUBCUTANEOUS
Refills: 0 | Status: DISCONTINUED | OUTPATIENT
Start: 2019-08-07 | End: 2019-08-08

## 2019-08-07 RX ORDER — METFORMIN HYDROCHLORIDE 850 MG/1
1 TABLET ORAL
Qty: 60 | Refills: 0
Start: 2019-08-07 | End: 2019-09-05

## 2019-08-07 RX ORDER — INSULIN LISPRO 100/ML
VIAL (ML) SUBCUTANEOUS AT BEDTIME
Refills: 0 | Status: DISCONTINUED | OUTPATIENT
Start: 2019-08-07 | End: 2019-08-08

## 2019-08-07 RX ORDER — INSULIN GLARGINE 100 [IU]/ML
15 INJECTION, SOLUTION SUBCUTANEOUS
Qty: 15 | Refills: 0
Start: 2019-08-07 | End: 2019-09-05

## 2019-08-07 RX ORDER — ISOPROPYL ALCOHOL, BENZOCAINE .7; .06 ML/ML; ML/ML
1 SWAB TOPICAL
Qty: 100 | Refills: 1
Start: 2019-08-07 | End: 2019-09-25

## 2019-08-07 RX ORDER — DEXTROSE 50 % IN WATER 50 %
12.5 SYRINGE (ML) INTRAVENOUS ONCE
Refills: 0 | Status: DISCONTINUED | OUTPATIENT
Start: 2019-08-07 | End: 2019-08-08

## 2019-08-07 RX ORDER — METOPROLOL TARTRATE 50 MG
1 TABLET ORAL
Qty: 0 | Refills: 0 | DISCHARGE
Start: 2019-08-07

## 2019-08-07 RX ADMIN — Medication 25 MILLIGRAM(S): at 17:20

## 2019-08-07 RX ADMIN — ATORVASTATIN CALCIUM 80 MILLIGRAM(S): 80 TABLET, FILM COATED ORAL at 21:13

## 2019-08-07 RX ADMIN — Medication 3: at 08:59

## 2019-08-07 RX ADMIN — Medication 81 MILLIGRAM(S): at 11:57

## 2019-08-07 RX ADMIN — CLOPIDOGREL BISULFATE 75 MILLIGRAM(S): 75 TABLET, FILM COATED ORAL at 11:57

## 2019-08-07 RX ADMIN — Medication 25 MILLIGRAM(S): at 06:19

## 2019-08-07 RX ADMIN — Medication 5: at 17:49

## 2019-08-07 RX ADMIN — LOSARTAN POTASSIUM 25 MILLIGRAM(S): 100 TABLET, FILM COATED ORAL at 06:19

## 2019-08-07 RX ADMIN — Medication 4: at 11:59

## 2019-08-07 RX ADMIN — Medication 5 UNIT(S): at 17:50

## 2019-08-07 RX ADMIN — INSULIN GLARGINE 15 UNIT(S): 100 INJECTION, SOLUTION SUBCUTANEOUS at 21:57

## 2019-08-07 RX ADMIN — Medication 1: at 21:58

## 2019-08-07 NOTE — PROGRESS NOTE ADULT - PROBLEM SELECTOR PLAN 2
-s/p stent 13 years ago  -continue with beta blocker, losartan, aspirin, plavix, and statin  F/U PMD

## 2019-08-07 NOTE — CONSULT NOTE ADULT - ASSESSMENT
64M w/ PMH DMII on orals and CAD s/p stent 2006 presents with chest pain found to have uncontrolled A1C of 13.4  endocrine called for further assitance   DM currently uncontrolled. Long discussion with pt and daughter about end organ effects of poor glycemic control.    #Diabetes  check FSBG TID qac and hs  carb consistent diet   - Lantus  15u qhs (please give first dose now). pt needs basal insulin in addition to short acting insulin given marked hyperglycemia   -Humalog 5-5-5 with meals  -low dose SS TIDAC/qhs      DC planning:  To prepare for dc:  basal/bolus + metformin (500mg ER BID to be uptitrated outpt). STOP glimepiride and JANUMET   - Make sure patient knows how to inject insulin and check fingersticks with glucometer (ask bedside RN for teaching)  - Discharge on premeal insulin and Lantus. do not dc on correction scale or bedtime scale.  Basal/bolus. Pt will need RX for rapid acting insulin pen (humalog/novolog/admelog) and basal (lantus/basaglar) depending on insurance coverage.    Please also send prescriptions for new glucometer per insurance, test strips, lancets, BD omar needles, alcohol pads and glucose tabs to the pts pharamcy prior to DC.  please [facility] glucometer [facility] and "ask for delivery to bedside" so that pt can be taught the glucometer being perscribed.  - At home, Patient should check FSBG premeals and bedtime. Pt should call their doctor when FSBG <70 or above >400 and or consistently above 200s as changes in the regimen will have to be made.  -pt should call PMD for DM related questions or concerns until pt is seen by CDE or endocrine, explained to pt to followup in 2 weeks with Blood glucose log.        #HLD  pt is on high dose statin, lipitor 80mg, check fasting lipid panel 64M w/ PMH DMII on orals and CAD s/p stent 2006 presents with chest pain found to have uncontrolled A1C of 13.4  endocrine called for further assitance   DM currently uncontrolled. Long discussion with pt and daughter about end organ effects of poor glycemic control.    #Diabetes  check FSBG TID qac and hs  carb consistent diet   - Lantus  15u qhs (please give first dose now). pt needs basal insulin in addition to short acting insulin given marked hyperglycemia   -Humalog 5-5-5 with meals  -low dose SS TIDAC/qhs      DC planning:  To prepare for dc:  basal/bolus + metformin (500mg ER BID to be uptitrated outpt). STOP glimepiride and JANUMET   - Make sure patient knows how to inject insulin and check fingersticks with glucometer (ask bedside RN for teaching)  - Discharge on premeal insulin and Lantus. do not dc on correction scale or bedtime scale.  Basal/bolus. Pt will need RX for rapid acting insulin pen (humalog/novolog/admelog) and basal (lantus/basaglar) depending on insurance coverage.    Please also send prescriptions for new glucometer per insurance, test strips, lancets, BD omar needles, alcohol pads and glucose tabs to the pts pharamcy prior to DC.  please send glucometer and [facility] and "ask for delivery to bedside" so that pt can be taught the glucometer being perscribed.  - At home, Patient should check FSBG premeals and bedtime. Pt should call their doctor when FSBG <70 or above >400 and or consistently above 200s as changes in the regimen will have to be made.  -pt should call PMD for DM related questions or concerns until pt is seen by CDE or endocrine, explained to pt to followup in 2 weeks with Blood glucose log.  -pt needs optho/podiatry and PCP followup    If patient wishes to follow up with [facility]     [facility]  [address] [place] [address], [address], [place], [place] [id number] [phone]   Please call to schedule appointment with CDE/Nutritionist and MD appointment next available           #HLD  pt is on high dose statin, lipitor 80mg, check fasting lipid panel

## 2019-08-07 NOTE — CONSULT NOTE ADULT - ATTENDING COMMENTS
Shanna Sanchez MD  Pager [phone] ([facility])/ [phone] ([facility]) [please provide 10 digit call back number]  Nights and weekends: [phone]  Please note that this patient may be followed by a different provider tomorrow. If no answer or after hours, please contact [phone].  For final dc reccomendations, please call [phone] or page the endocrine fellow on call.

## 2019-08-07 NOTE — CONSULT NOTE ADULT - SUBJECTIVE AND OBJECTIVE BOX
Reason For Consult: DM    HPI:  64M w/ PMH DMII and CAD s/p stent 2006 presents with chest pain. Pt reports chest pain which woke pt up from sleep this afternoon. Describes left sided substernal non radiating non-exertional sudden onset burning pain lasting until presentation. Reports some relief with aspirin and nitroglycerin. Denies associated dyspnea, diaphoresis, N/V, Diarrhea or dyspepsia. States he has had similar episodes of chest pain for the past few months, he will usually take an aspirin or plavix with some relief. Did not take anything at home this time. Denies recent stress testing. No fever, sweats, recent weight loss, Abd pain, no drug use, fam hx of CAD.  In the ED, vital Tmax: 98.8 HR: 73 - 76 BP: 121/69 - 165/72 RR: 16-18 SpO2: 98% - 100%.Trops negative, EKG with t wave inversions in leads v1-v4, no ST elevations or depressions. Pt was treated with aspirin and nitroglycerin (06 Aug 2019 03:42)      endocrine called for further assitance for DM   provider understands davi, asked pt if he would like felicita speaking  however he stated his daughter will translate and did not want a   provider spoke to the pt and daughter in davi and english  Pt has a 15 year hx of  DM on glimperide and janumet.  Diet is high in carbs and roti/rice.  Pt checks blood glucose once a day in the am and states it was 170s and never is higher than 200s.  Pt states DM has been controlled in the past.  States it only got higher bc he was in [place] and drinking tea with sugar.    Labs inpt notable for A1C 13.4  Ag 15   CO2 23  glucose in the 260- 300s, on low dose ISS no basal.      PAST MEDICAL & SURGICAL HISTORY:  CAD (coronary artery disease)  Hypertension  Diabetes  No significant past surgical history      FAMILY HISTORY:  No pertinent family history in first degree relatives      Social History:  lives with daughter     Outpatient Medications:  Home Medications:   * Incomplete Medication History as of 06-Aug-2019 03:36 documented in Structured Notes  · 	metFORMIN 500 mg oral tablet: Last Dose Taken:  , 1 tab(s) orally once a day  · 	Plavix 75 mg oral tablet: Last Dose Taken:  , 1 tab(s) orally once a day  · 	simvastatin 40 mg oral tablet: 1 tab(s) orally once a day (at bedtime)  · 	aspirin 81 mg oral tablet: Last Dose Taken:  , 1 tab(s) orally once a day    however pt endorses janumet and glimepride     MEDICATIONS  (STANDING):  aspirin  chewable 81 milliGRAM(s) Oral daily  atorvastatin 80 milliGRAM(s) Oral at bedtime  clopidogrel Tablet 75 milliGRAM(s) Oral daily  dextrose 5%. 1000 milliLiter(s) (50 mL/Hr) IV Continuous <Continuous>  dextrose 50% Injectable 12.5 Gram(s) IV Push once  dextrose 50% Injectable 25 Gram(s) IV Push once  dextrose 50% Injectable 25 Gram(s) IV Push once  insulin glargine Injectable (LANTUS) 15 Unit(s) SubCutaneous at bedtime  insulin lispro (HumaLOG) corrective regimen sliding scale   SubCutaneous three times a day before meals  insulin lispro (HumaLOG) corrective regimen sliding scale   SubCutaneous at bedtime  insulin lispro Injectable (HumaLOG) 5 Unit(s) SubCutaneous three times a day before meals  losartan 25 milliGRAM(s) Oral daily  metoprolol tartrate 25 milliGRAM(s) Oral two times a day    MEDICATIONS  (PRN):  dextrose 40% Gel 15 Gram(s) Oral once PRN Blood Glucose LESS THAN 70 milliGRAM(s)/deciliter  glucagon  Injectable 1 milliGRAM(s) IntraMuscular once PRN Glucose LESS THAN 70 milligrams/deciliter      Allergies    No Known Allergies    Intolerances      Review of Systems:  Constitutional: No fever  Eyes: No blurry vision  Neuro: No tremors  HEENT: No pain  Cardiovascular: No chest pain, palpitations  Respiratory: No SOB, no cough  GI: No nausea, vomiting, abdominal pain  : No dysuria  Skin: no rash  Psych: no depression  Endocrine: no polyuria, polydipsia  Hem/lymph: no swelling  Osteoporosis: no fractures    ALL OTHER SYSTEMS REVIEWED AND NEGATIVE      PHYSICAL EXAM:  VITALS: T(C): 36.4 (08-07-19 @ 12:03)  T(F): 97.6 (08-07-19 @ 12:03), Max: 97.6 (08-06-19 @ 22:00)  HR: 68 (08-07-19 @ 12:03) (68 - 73)  BP: 108/56 (08-07-19 @ 12:03) (108/56 - 136/69)  RR:  (16 - 17)  SpO2:  (96% - 97%)  Wt(kg): --  GENERAL: NAD, well-groomed, well-developed  EYES: No proptosis, no lid lag, anicteric  HEENT:  Atraumatic, Normocephalic, moist mucous membranes  RESPIRATORY: Clear to auscultation bilaterally; No rales, rhonchi, wheezing, or rubs  CARDIOVASCULAR: Regular rate and rhythm; No murmurs; no peripheral edema  GI: Soft, nontender, non distended, normal bowel sounds  SKIN: Dry, intact, No rashes or lesions  MUSCULOSKELETAL: Full range of motion, normal strength  NEURO: sensation intact, extraocular movements intact, no tremor, normal reflexes  PSYCH: Alert and oriented x 3, normal affect, normal mood      POCT Blood Glucose.: 326 mg/dL (08-07-19 @ 11:45)  POCT Blood Glucose.: 262 mg/dL (08-07-19 @ 08:53)  POCT Blood Glucose.: 332 mg/dL (08-06-19 @ 21:49)  POCT Blood Glucose.: 258 mg/dL (08-06-19 @ 17:56)  POCT Blood Glucose.: 274 mg/dL (08-06-19 @ 13:42)  POCT Blood Glucose.: 277 mg/dL (08-06-19 @ 09:11)                            14.1   8.45  )-----------( 233      ( 07 Aug 2019 06:20 )             43.7       08-07    137  |  99  |  18  ----------------------------<  276<H>  4.5   |  23  |  0.62    EGFR if : 122  EGFR if non : 106    Ca    9.6      08-07  Mg     1.8     08-07  Phos  3.1     08-07    TPro  7.5  /  Alb  4.3  /  TBili  0.3  /  DBili  x   /  AST  24  /  ALT  40  /  AlkPhos  99  08-05      Thyroid Function Tests:      Hemoglobin A1C, Whole Blood: 13.4 % <H> [4.0 - 5.6] (08-06-19 @ 06:15)          Radiology:

## 2019-08-07 NOTE — PROGRESS NOTE ADULT - SUBJECTIVE AND OBJECTIVE BOX
Cardiology Attending Progress Note    No new complaints this AM  Denies having chest pain, palpitations    Reviewed test results with patient --> no significant findings noted on TTE   No ischemia on NST    Telemetry no new events    D/C home today    Vital Signs Last 24 Hrs  T(C): 36.4 (07 Aug 2019 06:03), Max: 36.4 (06 Aug 2019 10:06)  T(F): 97.5 (07 Aug 2019 06:03), Max: 97.6 (06 Aug 2019 10:06)  HR: 73 (07 Aug 2019 06:03) (58 - 73)  BP: 136/69 (07 Aug 2019 06:03) (114/67 - 136/69)  BP(mean): --  RR: 17 (07 Aug 2019 06:03) (16 - 18)  SpO2: 96% (07 Aug 2019 06:03) (96% - 98%)    NAD, laying flat in bed  No JVD  Reg S1S2  CTAB  Soft abdomen NT ND  No edema    MEDICATIONS  (STANDING):  aspirin  chewable 81 milliGRAM(s) Oral daily  atorvastatin 80 milliGRAM(s) Oral at bedtime  clopidogrel Tablet 75 milliGRAM(s) Oral daily  dextrose 5%. 1000 milliLiter(s) (50 mL/Hr) IV Continuous <Continuous>  dextrose 50% Injectable 12.5 Gram(s) IV Push once  dextrose 50% Injectable 25 Gram(s) IV Push once  dextrose 50% Injectable 25 Gram(s) IV Push once  insulin lispro (HumaLOG) corrective regimen sliding scale   SubCutaneous three times a day before meals  insulin lispro (HumaLOG) corrective regimen sliding scale   SubCutaneous at bedtime  losartan 25 milliGRAM(s) Oral daily  metoprolol tartrate 25 milliGRAM(s) Oral two times a day    MEDICATIONS  (PRN):  dextrose 40% Gel 15 Gram(s) Oral once PRN Blood Glucose LESS THAN 70 milliGRAM(s)/deciliter  glucagon  Injectable 1 milliGRAM(s) IntraMuscular once PRN Glucose LESS THAN 70 milligrams/deciliter                          14.1   8.45  )-----------( 233      ( 07 Aug 2019 06:20 )             43.7     08-05    135  |  98  |  18  ----------------------------<  291<H>  4.5   |  26  |  0.73    Ca    9.8      05 Aug 2019 19:20    TPro  7.5  /  Alb  4.3  /  TBili  0.3  /  DBili  x   /  AST  24  /  ALT  40  /  AlkPhos  99  08-05      PT/INR - ( 05 Aug 2019 19:20 )   PT: 11.8 SEC;   INR: 1.03     PTT - ( 05 Aug 2019 19:20 )  PTT:28.6 SEC    < from: Xray Chest 2 Views PA/Lat (19 @ 19:48) >    EXAM:  XR CHEST PA LAT 2V        PROCEDURE DATE:  Aug  5 2019         INTERPRETATION:    CLINICAL INDICATION: Chest pain.    TECHNIQUE: PA and lateral views of the chest.    COMPARISON: Chest x-ray 3/22/2019, 2015.    FINDINGS:     Cardiomediastinal silhouette is normal. Coronary stents are noted.   The lungs are clear.   There are no pleural effusions. No pneumothorax.  There is no acute osseous abnormality.     IMPRESSION:   Clear lungs.       ELIZABETH PRIEST M.D., RADIOLOGY RESIDENT  This document has been electronically signed.  CARLEY VICKERS M.D., ATTENDING RADIOLOGIST  This document has been electronically signed. Aug  6 2019  8:33AM            < from: Transthoracic Echocardiogram (19 @ 12:26) >    Patient name: NORMA HAZEL  YOB: 1955   Age: 64 (M)   MR#: [id number]  Study Date: 2019  [facility] StressSonographer: Jose Armando Farah RDCS  Study quality: Technically good  Referring Physician: Jean Pierre Damico MD  BloodPressure: 128/89 mmHg  Height: 168 cm  Weight: 68 kg  BSA: 1.8 m2  ------------------------------------------------------------------------  PROCEDURE: Transthoracic echocardiogram with 2-D, M-Mode  and complete spectral and color flow Doppler.  INDICATION: Chest pain, unspecified (R07.9)  ------------------------------------------------------------------------  DIMENSIONS:  Dimensions:     Normal Values:  LA:     3.0 cm    2.0 - 4.0 cm  Ao:     3.2 cm    2.0 - 3.8 cm  SEPTUM: 0.7 cm    0.6 - 1.2cm  PWT:    0.7 cm    0.6 - 1.1 cm  LVIDd:  4.0 cm    3.0 - 5.6 cm  LVIDs:  2.6 cm    1.8 - 4.0 cm  Derived Variables:  LVMI: 44 g/m2  RWT: 0.35  Fractional short: 35 %  Ejection Fraction (Teicholtz): 65 %  ------------------------------------------------------------------------  OBSERVATIONS:  Mitral Valve: Mitral annular calcification, otherwise  normal mitral valve. Minimal mitral regurgitation.  Aortic Root: Normal aortic root.  Aortic Valve: Aortic valve leaflet morphology not well  visualized.  Left Atrium: Normal left atrium.  Left Ventricle: Normal left ventricular systolic function.  No segmental wall motion abnormalities. Normal left  ventricular internal dimensions and wall thicknesses.  Right Heart: Normal right atrium. Normal right ventricular  size and function. Normal tricuspid valve. Minimal  tricuspid regurgitation. Normal pulmonic valve.  Pericardium/PleuraNormal pericardium with no pericardial  effusion.  ------------------------------------------------------------------------  CONCLUSIONS:  1. Mitral annular calcification, otherwise normal mitral  valve. Minimal mitral regurgitation.  2. Normal left ventricular internal dimensions and wall  thicknesses.  3. Normal left ventricular systolic function. No segmental  wall motion abnormalities.  4. Normal right ventricular size and function.  ------------------------------------------------------------------------  Confirmed on  2019 - 14:20:41 by Mejia Ruby M.D.  ------------------------------------------------------------------------    < end of copied text >    < from: Nuclear Stress Test-Pharmacologic (19 @ 15:43) >    PATIENT: NORMA HAZEL  : 1955   AGE: 64 (M)   MR#: [id number]  STUDY DATE: 2019  LOCATION: [facility]. PHYSICIAN(S): Jean Pierre Damico MD  FELLOW: MALLORY Ortiz PA  ------------------------------------------------------------------------  TYPE OF TEST: Stress Pharmacologic  INDICATION: Chest pain, unspecified (R07.9)  ------------------------------------------------------------------------  HISTORY:  CARDIAC HISTORY: 64 year old male with past medical  history of CAD with stent (), Hypertension, Diabetes  mellitus, hyperlipidemia.  RISK FACTORS: Diabetes, Elevated Cholesterol, Hypertension  MEDICATIONS: metoprolol, aspirin, plavix, atorvastatin,  metformin, losartan  ------------------------------------------------------------------------  BASELINE ELECTROCARDIOGRAM:  Rhythm: Normal Sinus Rhythm - 67 BPM  Conduction Defect: RBBB  ------------------------------------------------------------------------  HEMODYNAMIC PARAMETERS:                                      HR   BP  Baseline  Pre-TM Start              69  123/59  01:30     Inject Regadenoson on TM  96  02:00     Post Injection on TM      96  127/65  03:00     Post Injection on TM      93  109/67  04:00     Post Injection on TM      88  05:00     Post Injection on TM      87  111/58  06:00     Recovery                  86  07:00     Recovery                  83   95/64  08:00     Recovery                  86   94/55  09:00     Recovery                  86  10:00     Recovery                  84  12:00   Recovery                  83   92/52  15:00     Recovery                  80   98/51  ------------------------------------------------------------------------  Agent: Regadenoson 0.4 mg/5 ml NS. injected over 10 sec.  Aminophylline: 75 mg  HR: Baseline HR: 69 bpm   Peak HR: 114 bpm (73% of MPHR)  MPHR: 157 bpm   85% of MPHR: 133 bpm  BP: Baseline BP: 123/59 mmHg   Peak BP: 127/65 mmHg   Peak  RPP: 15260 (Rate Pressure Product)  Last Caffeine intake: 12 hrs  Terminated: Completion of protocol  Comments: Exercised 7:09 Bill protocol achieving peak HR  114 bpm (73% MPHR), blunted due to beta blocker. No CP, no  ST depression and no arrhythmia with exercise.  ------------------------------------------------------------------------  SYMPTOMS/FINDINGS:  Symptoms: Fatigue  Chest pain: No chest pain with administration of  Regadenoson  Treatment: 75 mg Aminophylline was given at 11 min of post  infusion for hypotension  ------------------------------------------------------------------------  ECG ABNORMALITIES DURING/AFTER STRESS:   ST Changes:ST Depression: 2 mm upsloping in leads I, II,  AVF,V4, V5, V6 started at 02:00 min of infusion and  persisted 12:00 min into post infusion.  ST Elevation: 2 mm down sloping in leads aVR,V1 started at  02:00 min of  and persisted 12:00 min into .  ------------------------------------------------------------------------  NEW ARRHYTHMIAS DEVELOPED DURING/AFTER STRESS:  None  ------------------------------------------------------------------------  STRESS TEST IMPRESSIONS:   73% of MPHR.  Chest Pain: No chest pain with administration of  Regadenoson.  Symptom: Fatigue.  HR Response: Appropriate.  BP Response: Appropriate.  Heart Rhythm: Normal Sinus Rhythm - 67 BPM.  Conduction defects: RBBB.  ECG Changes: ST Depression: 2 mm upsloping in leads I, II,  AVF,V4, V5, V6 started at 02:00 min of infusion and  persisted 12:00 min into post infusion.  ST Elevation: 2 mm down sloping in leads aVR,V1 started at  02:00 min of  and persisted 12:00 min into .  Arrhythmia: None.  NOTE: ST-segment depression occurred with regadenoson, but  not with exercise. There was no chest pain with exercise.  ------------------------------------------------------------------------  PROCEDURE:  7.9 mCi of Tc 99m Tetrofosmin were injected during stress  protocol. Approximately 45 minutes later, tomographic  images were obtained in a 180 degree arc from right  anterior oblique to left anterior oblique with 64 stops.  The tomographic slices were reconstructed in 3 orthogonal  planes (short axis, horizontal long axis and vertical long  axis).  Interpretation was performed both by visual and  quantitative analysis.  Stress images were acquired using CZT-based system with  pinhole collimation ([employer], [employer]),  and reconstructed using MLEM algorithm. Images were  re-acquired with the patient in a prone position.  ------------------------------------------------------------------------  NUCLEAR FINDINGS:  Review of raw data shows: The studyis of good technical  quality.  The left ventricle was normal in size. Normal myocardial  perfusion scan,with no evidence of infarction or inducible  ischemia.  ------------------------------------------------------------------------  GATED ANALYSIS:  Post-stress gated wall motion analysis was performed (LVEF  = 51 %;LVEDV = 72 ml.), revealing normal LV function. RV  function appeared normal.  ------------------------------------------------------------------------  IMPRESSIONS:Normal Study  * Myocardial Perfusion SPECT results are normal.  * Review of raw data shows: The study is of good technical  quality.  * The left ventricle was normal in size. Normal myocardial  perfusion scan,with no evidence of infarction or inducible  ischemia.  * Post-stress gated wall motion analysis was performed  (LVEF = 51 %;LVEDV = 72 ml.), revealing normal LV  function. RV function appeared normal.  ------------------------------------------------------------------------  Confirmed on  2019 - 17:14:27 by Balwinder Germain M.D.  ------------------------------------------------------------------------    < end of copied text >

## 2019-08-08 ENCOUNTER — TRANSCRIPTION ENCOUNTER (OUTPATIENT)
Age: 64
End: 2019-08-08

## 2019-08-08 VITALS — WEIGHT: 155.87 LBS

## 2019-08-08 PROBLEM — Z00.00 ENCOUNTER FOR PREVENTIVE HEALTH EXAMINATION: Status: ACTIVE | Noted: 2019-08-08

## 2019-08-08 LAB
ANION GAP SERPL CALC-SCNC: 11 MMO/L — SIGNIFICANT CHANGE UP (ref 7–14)
BUN SERPL-MCNC: 19 MG/DL — SIGNIFICANT CHANGE UP (ref 7–23)
CALCIUM SERPL-MCNC: 9.1 MG/DL — SIGNIFICANT CHANGE UP (ref 8.4–10.5)
CHLORIDE SERPL-SCNC: 103 MMOL/L — SIGNIFICANT CHANGE UP (ref 98–107)
CHOLEST SERPL-MCNC: 179 MG/DL — SIGNIFICANT CHANGE UP (ref 120–199)
CO2 SERPL-SCNC: 22 MMOL/L — SIGNIFICANT CHANGE UP (ref 22–31)
CREAT SERPL-MCNC: 0.6 MG/DL — SIGNIFICANT CHANGE UP (ref 0.5–1.3)
GLUCOSE BLDC GLUCOMTR-MCNC: 242 MG/DL — HIGH (ref 70–99)
GLUCOSE BLDC GLUCOMTR-MCNC: 271 MG/DL — HIGH (ref 70–99)
GLUCOSE SERPL-MCNC: 272 MG/DL — HIGH (ref 70–99)
HCT VFR BLD CALC: 44.4 % — SIGNIFICANT CHANGE UP (ref 39–50)
HDLC SERPL-MCNC: 31 MG/DL — LOW (ref 35–55)
HGB BLD-MCNC: 14.2 G/DL — SIGNIFICANT CHANGE UP (ref 13–17)
LIPID PNL WITH DIRECT LDL SERPL: 136 MG/DL — SIGNIFICANT CHANGE UP
MAGNESIUM SERPL-MCNC: 1.8 MG/DL — SIGNIFICANT CHANGE UP (ref 1.6–2.6)
MCHC RBC-ENTMCNC: 25.7 PG — LOW (ref 27–34)
MCHC RBC-ENTMCNC: 32 % — SIGNIFICANT CHANGE UP (ref 32–36)
MCV RBC AUTO: 80.4 FL — SIGNIFICANT CHANGE UP (ref 80–100)
NRBC # FLD: 0 K/UL — SIGNIFICANT CHANGE UP (ref 0–0)
PHOSPHATE SERPL-MCNC: 3.1 MG/DL — SIGNIFICANT CHANGE UP (ref 2.5–4.5)
PLATELET # BLD AUTO: 211 K/UL — SIGNIFICANT CHANGE UP (ref 150–400)
PMV BLD: 10.8 FL — SIGNIFICANT CHANGE UP (ref 7–13)
POTASSIUM SERPL-MCNC: 4.5 MMOL/L — SIGNIFICANT CHANGE UP (ref 3.5–5.3)
POTASSIUM SERPL-SCNC: 4.5 MMOL/L — SIGNIFICANT CHANGE UP (ref 3.5–5.3)
RBC # BLD: 5.52 M/UL — SIGNIFICANT CHANGE UP (ref 4.2–5.8)
RBC # FLD: 13.5 % — SIGNIFICANT CHANGE UP (ref 10.3–14.5)
SODIUM SERPL-SCNC: 136 MMOL/L — SIGNIFICANT CHANGE UP (ref 135–145)
TRIGL SERPL-MCNC: 98 MG/DL — SIGNIFICANT CHANGE UP (ref 10–149)
WBC # BLD: 8.02 K/UL — SIGNIFICANT CHANGE UP (ref 3.8–10.5)
WBC # FLD AUTO: 8.02 K/UL — SIGNIFICANT CHANGE UP (ref 3.8–10.5)

## 2019-08-08 PROCEDURE — 99232 SBSQ HOSP IP/OBS MODERATE 35: CPT

## 2019-08-08 PROCEDURE — 99239 HOSP IP/OBS DSCHRG MGMT >30: CPT

## 2019-08-08 RX ORDER — SIMVASTATIN 20 MG/1
1 TABLET, FILM COATED ORAL
Qty: 30 | Refills: 0
Start: 2019-08-08 | End: 2019-09-06

## 2019-08-08 RX ORDER — METFORMIN HYDROCHLORIDE 850 MG/1
1 TABLET ORAL
Qty: 60 | Refills: 0
Start: 2019-08-08 | End: 2019-09-06

## 2019-08-08 RX ORDER — INSULIN LISPRO 100/ML
6 VIAL (ML) SUBCUTANEOUS
Refills: 0 | Status: DISCONTINUED | OUTPATIENT
Start: 2019-08-08 | End: 2019-08-08

## 2019-08-08 RX ORDER — CLOPIDOGREL BISULFATE 75 MG/1
1 TABLET, FILM COATED ORAL
Qty: 30 | Refills: 0
Start: 2019-08-08 | End: 2019-09-06

## 2019-08-08 RX ORDER — METFORMIN HYDROCHLORIDE 850 MG/1
1 TABLET ORAL
Qty: 0 | Refills: 0 | DISCHARGE

## 2019-08-08 RX ORDER — INSULIN GLARGINE 100 [IU]/ML
18 INJECTION, SOLUTION SUBCUTANEOUS AT BEDTIME
Refills: 0 | Status: DISCONTINUED | OUTPATIENT
Start: 2019-08-08 | End: 2019-08-08

## 2019-08-08 RX ORDER — METOPROLOL TARTRATE 50 MG
1 TABLET ORAL
Qty: 60 | Refills: 0
Start: 2019-08-08 | End: 2019-09-06

## 2019-08-08 RX ORDER — CLOPIDOGREL BISULFATE 75 MG/1
1 TABLET, FILM COATED ORAL
Qty: 0 | Refills: 0 | DISCHARGE

## 2019-08-08 RX ORDER — INSULIN LISPRO 100/ML
6 VIAL (ML) SUBCUTANEOUS
Qty: 5 | Refills: 0
Start: 2019-08-08 | End: 2019-09-06

## 2019-08-08 RX ORDER — INSULIN GLARGINE 100 [IU]/ML
18 INJECTION, SOLUTION SUBCUTANEOUS
Qty: 15 | Refills: 0
Start: 2019-08-08 | End: 2019-09-06

## 2019-08-08 RX ORDER — SIMVASTATIN 20 MG/1
1 TABLET, FILM COATED ORAL
Qty: 0 | Refills: 0 | DISCHARGE

## 2019-08-08 RX ORDER — LOSARTAN POTASSIUM 100 MG/1
1 TABLET, FILM COATED ORAL
Qty: 30 | Refills: 0
Start: 2019-08-08 | End: 2019-09-06

## 2019-08-08 RX ADMIN — Medication 81 MILLIGRAM(S): at 11:36

## 2019-08-08 RX ADMIN — Medication 3: at 12:25

## 2019-08-08 RX ADMIN — Medication 2: at 08:58

## 2019-08-08 RX ADMIN — CLOPIDOGREL BISULFATE 75 MILLIGRAM(S): 75 TABLET, FILM COATED ORAL at 11:36

## 2019-08-08 RX ADMIN — Medication 5 UNIT(S): at 08:58

## 2019-08-08 RX ADMIN — Medication 25 MILLIGRAM(S): at 05:41

## 2019-08-08 RX ADMIN — Medication 6 UNIT(S): at 12:24

## 2019-08-08 RX ADMIN — LOSARTAN POTASSIUM 25 MILLIGRAM(S): 100 TABLET, FILM COATED ORAL at 05:41

## 2019-08-08 NOTE — DIETITIAN INITIAL EVALUATION ADULT. - ENERGY NEEDS
Ht: 167.64cm Wt: 82 kg 8/8/19 BMI: 29.1  IBW: 142 pounds +/-10% (126-156 pounds)  Edema: None  Pressure Injuries: None

## 2019-08-08 NOTE — PROGRESS NOTE ADULT - SUBJECTIVE AND OBJECTIVE BOX
Chief Complaint: DM 2 with hyperglycemia    History: Patient seen at bedside. Patient declines use of  phone to [facility], requests provider speak to himself and daughter Thomas via telephone in English. Patient reports he is feeling well, tolerating meals, denies n/v, denies s/s of hypoglycemia. Plan for discharge today, explained basal/bolus plus Metformin regimen and stopping previous oral agents. Patient/daughter verbalize understanding of instructions and follow up.     MEDICATIONS  (STANDING):  aspirin  chewable 81 milliGRAM(s) Oral daily  atorvastatin 80 milliGRAM(s) Oral at bedtime  clopidogrel Tablet 75 milliGRAM(s) Oral daily  dextrose 5%. 1000 milliLiter(s) (50 mL/Hr) IV Continuous <Continuous>  dextrose 50% Injectable 12.5 Gram(s) IV Push once  dextrose 50% Injectable 25 Gram(s) IV Push once  dextrose 50% Injectable 25 Gram(s) IV Push once  insulin glargine Injectable (LANTUS) 18 Unit(s) SubCutaneous at bedtime  insulin lispro (HumaLOG) corrective regimen sliding scale   SubCutaneous three times a day before meals  insulin lispro (HumaLOG) corrective regimen sliding scale   SubCutaneous at bedtime  insulin lispro Injectable (HumaLOG) 6 Unit(s) SubCutaneous three times a day before meals  losartan 25 milliGRAM(s) Oral daily  metoprolol tartrate 25 milliGRAM(s) Oral two times a day    MEDICATIONS  (PRN):  dextrose 40% Gel 15 Gram(s) Oral once PRN Blood Glucose LESS THAN 70 milliGRAM(s)/deciliter  glucagon  Injectable 1 milliGRAM(s) IntraMuscular once PRN Glucose LESS THAN 70 milligrams/deciliter      Allergies    No Known Allergies    Intolerances      Review of Systems:  Constitutional: No fever  Eyes: No blurry vision  Neuro: No tremors  HEENT: No pain  Cardiovascular: No chest pain, palpitations  Respiratory: No SOB, no cough  GI: No nausea, vomiting, abdominal pain  : No dysuria  Skin: no rash  Psych: no depression  Endocrine: no polyuria, polydipsia  Hem/lymph: no swelling  Osteoporosis: no fractures    ALL OTHER SYSTEMS REVIEWED AND NEGATIVE    UNABLE TO OBTAIN    PHYSICAL EXAM:  VITALS: T(C): 36.5 (08-08-19 @ 11:40)  T(F): 97.7 (08-08-19 @ 11:40), Max: 97.9 (08-08-19 @ 05:39)  HR: 68 (08-08-19 @ 11:40) (63 - 78)  BP: 116/64 (08-08-19 @ 11:40) (108/56 - 134/68)  RR:  (16 - 17)  SpO2:  (97% - 100%)  Wt(kg): --  GENERAL: NAD, well-groomed, well-developed  EYES: No proptosis, no lid lag, anicteric  HEENT:  Atraumatic, Normocephalic, moist mucous membranes  THYROID: Normal size, no palpable nodules  RESPIRATORY: Clear to auscultation bilaterally; No rales, rhonchi, wheezing  CARDIOVASCULAR: Regular rate and rhythm; No murmurs; no peripheral edema  GI: Soft, nontender, non distended  SKIN: Dry, intact, No rashes or lesions  MUSCULOSKELETAL: Full range of motion, normal strength  NEURO: extraocular movements intact, no tremor  PSYCH: Alert and oriented x 3, normal affect, normal mood      CAPILLARY BLOOD GLUCOSE      POCT Blood Glucose.: 242 mg/dL (08 Aug 2019 08:38)  POCT Blood Glucose.: 290 mg/dL (07 Aug 2019 21:47)  POCT Blood Glucose.: 375 mg/dL (07 Aug 2019 17:47)  POCT Blood Glucose.: 326 mg/dL (07 Aug 2019 11:45)      08-08    136  |  103  |  19  ----------------------------<  272<H>  4.5   |  22  |  0.60    EGFR if : 124  EGFR if non : 107    Ca    9.1      08-08  Mg     1.8     08-08  Phos  3.1     08-08    TPro  7.5  /  Alb  4.3  /  TBili  0.3  /  DBili  x   /  AST  24  /  ALT  40  /  AlkPhos  99  08-05          Thyroid Function Tests:      Hemoglobin A1C, Whole Blood: 13.4 % <H> [4.0 - 5.6] (08-06-19 @ 06:15) Chief Complaint: DM 2 with hyperglycemia    History: Patient seen at bedside. Patient declines use of  phone to [facility], requests provider speak to himself and daughter Thomas via telephone in English. Patient reports he is feeling well, tolerating meals, denies n/v, denies s/s of hypoglycemia. Plan for discharge today, explained basal/bolus plus Metformin regimen and stopping previous oral agents. Patient/daughter verbalize understanding of instructions and follow up.     MEDICATIONS  (STANDING):  aspirin  chewable 81 milliGRAM(s) Oral daily  atorvastatin 80 milliGRAM(s) Oral at bedtime  clopidogrel Tablet 75 milliGRAM(s) Oral daily  dextrose 5%. 1000 milliLiter(s) (50 mL/Hr) IV Continuous <Continuous>  dextrose 50% Injectable 12.5 Gram(s) IV Push once  dextrose 50% Injectable 25 Gram(s) IV Push once  dextrose 50% Injectable 25 Gram(s) IV Push once  insulin glargine Injectable (LANTUS) 18 Unit(s) SubCutaneous at bedtime  insulin lispro (HumaLOG) corrective regimen sliding scale   SubCutaneous three times a day before meals  insulin lispro (HumaLOG) corrective regimen sliding scale   SubCutaneous at bedtime  insulin lispro Injectable (HumaLOG) 6 Unit(s) SubCutaneous three times a day before meals  losartan 25 milliGRAM(s) Oral daily  metoprolol tartrate 25 milliGRAM(s) Oral two times a day    MEDICATIONS  (PRN):  dextrose 40% Gel 15 Gram(s) Oral once PRN Blood Glucose LESS THAN 70 milliGRAM(s)/deciliter  glucagon  Injectable 1 milliGRAM(s) IntraMuscular once PRN Glucose LESS THAN 70 milligrams/deciliter    No Known Allergies    Review of Systems:  Constitutional: No fever  Cardiovascular: No chest pain, palpitations  Respiratory: No SOB, no cough  GI: No nausea, vomiting, abdominal pain    PHYSICAL EXAM:  VITALS: T(C): 36.5 (08-08-19 @ 11:40)  T(F): 97.7 (08-08-19 @ 11:40), Max: 97.9 (08-08-19 @ 05:39)  HR: 68 (08-08-19 @ 11:40) (63 - 78)  BP: 116/64 (08-08-19 @ 11:40) (108/56 - 134/68)  RR:  (16 - 17)  SpO2:  (97% - 100%)  Wt(kg): --  GENERAL: NAD, well-groomed, well-developed  EYES: No proptosis, no lid lag, anicteric  HEENT:  Atraumatic, Normocephalic, moist mucous membranes  RESPIRATORY: unlabored respirations  PSYCH: Alert and oriented x 3, normal affect, normal mood    CAPILLARY BLOOD GLUCOSE    POCT Blood Glucose.: 242 mg/dL (08 Aug 2019 08:38)  POCT Blood Glucose.: 290 mg/dL (07 Aug 2019 21:47)  POCT Blood Glucose.: 375 mg/dL (07 Aug 2019 17:47)  POCT Blood Glucose.: 326 mg/dL (07 Aug 2019 11:45)      08-08    136  |  103  |  19  ----------------------------<  272<H>  4.5   |  22  |  0.60    EGFR if : 124  EGFR if non : 107    Ca    9.1      08-08  Mg     1.8     08-08  Phos  3.1     08-08    TPro  7.5  /  Alb  4.3  /  TBili  0.3  /  DBili  x   /  AST  24  /  ALT  40  /  AlkPhos  99  08-05      Hemoglobin A1C, Whole Blood: 13.4 % <H> [4.0 - 5.6] (08-06-19 @ 06:15)

## 2019-08-08 NOTE — DISCHARGE NOTE NURSING/CASE MANAGEMENT/SOCIAL WORK - NSDCDPATPORTLINK_GEN_ALL_CORE
You can access the [facility] Patient Portal, offered by [facility], by registering with the following website: [email or web address]

## 2019-08-08 NOTE — PROGRESS NOTE ADULT - ASSESSMENT
64M w/ PMH DMII and CAD s/p stent 2006 presents with chest pain admitted for r/o ACS    No new complaints this AM  Denies having chest pain, palpitations    Reviewed test results with patient --> no significant findings noted on TTE   No ischemia on NST    Telemetry no new events    D/C home today
64M w/ PMH DMII and CAD s/p stent 2006 presents with chest pain admitted for r/o ACS    No new complaints this AM  Denies having chest pain, palpitations    Reviewed test results with patient --> no significant findings noted on TTE   No ischemia on NST    Telemetry no new events    Significantly elevated HgbA1C --> to receive DM2 teaching prior to discharge
64M w/ PMH uncontrolled DM 2 on orals (HbA1c 13.4%) and CAD s/p stent 2006 presents with chest pain, endocrine called for further assistance.     1. DM 2 with hyperglycemia  -Inpatient BG target 100-180 mg/dl, patient above target today, just started on basal bolus regimen yesterday  While inpatient:  -Would continue to check BG before meals and bedtime  -Would increase Lantus to 18 units SQ qHS  -Would increase Humalog to 6 units SQ TID before meals (Hold if NPO)  -Would continue Humalog low dose correctional scale SQ TID before meals and Humalog low dose bedtime correctional scale SQ qHS   -Continue consistent carbohydrate diet - patient would benefit from registered dietitian consult while inpatient if possible    Discharge Plan:  -Would recommend discharging on basal/bolus insulin pen regimen due to uncontrolled HbA1c 13.4%. Would discharge on current doses Lantus 18 units SQ qHS and Humalog 6 units SQ TID before meals (Hold if not eating meal). No sliding scale for discharge. (Options for basal insulin pens- Lantus or Basaglar, options for bolus insulin pens Humalog, Novolog or Admelog, per insurance preferred).  -Would also discharge on Metformin  mg PO BID (to be uptitrated outpatient). STOP previous oral agents glimepiride and Janumet.  -Please insure patient has prescription for glucometer (per insurance), glucose test strips, lancets, alcohol swabs and pen needles  -Reviewed insulin pen use with patient and daughter who verbalize understanding. Patient's wife at home uses insulin pen, family and patient feel comfortable with insulin pen use. DM education documented by nursing staff- see POC flowsheet  - At home, patient should check FSBG premeals and bedtime. Instructed to keep log of blood glucose at home and bring to follow up appointment. Pt should call their doctor when FSBG <70, reviewed hypoglycemia recognition and treatment with patient and daughter. Also notified patient and daughter to call MD for BG greater than 300 mg/dl x2 consecutively.  -Patient will need optho/podiatry and PCP followup outpatient  -Endocrine follow up appointments scheduled:  1. With diabetes educator: Wednesday August 28, 2019 at 9:00 AM, [address] [place] [address], [address], [place] [address], [phone]   2. With endocrinologist, Dr. Sanchez: Tuesday October 22, 2019 at 11:15 AM, [address] [facility], [address], [place] [address], [phone]

## 2019-08-08 NOTE — DIETITIAN INITIAL EVALUATION ADULT. - PERTINENT MEDS FT
MEDICATIONS  (STANDING):  aspirin  chewable 81 milliGRAM(s) Oral daily  atorvastatin 80 milliGRAM(s) Oral at bedtime  clopidogrel Tablet 75 milliGRAM(s) Oral daily  dextrose 5%. 1000 milliLiter(s) (50 mL/Hr) IV Continuous <Continuous>  dextrose 50% Injectable 12.5 Gram(s) IV Push once  dextrose 50% Injectable 25 Gram(s) IV Push once  dextrose 50% Injectable 25 Gram(s) IV Push once  insulin glargine Injectable (LANTUS) 18 Unit(s) SubCutaneous at bedtime  insulin lispro (HumaLOG) corrective regimen sliding scale   SubCutaneous three times a day before meals  insulin lispro (HumaLOG) corrective regimen sliding scale   SubCutaneous at bedtime  insulin lispro Injectable (HumaLOG) 6 Unit(s) SubCutaneous three times a day before meals  losartan 25 milliGRAM(s) Oral daily  metoprolol tartrate 25 milliGRAM(s) Oral two times a day    MEDICATIONS  (PRN):  dextrose 40% Gel 15 Gram(s) Oral once PRN Blood Glucose LESS THAN 70 milliGRAM(s)/deciliter  glucagon  Injectable 1 milliGRAM(s) IntraMuscular once PRN Glucose LESS THAN 70 milligrams/deciliter

## 2019-08-08 NOTE — DIETITIAN INITIAL EVALUATION ADULT. - PERTINENT LABORATORY DATA
08-08 Na136 mmol/L Glu 272 mg/dL<H> K+ 4.5 mmol/L Cr  0.60 mg/dL BUN 19 mg/dL 08-08 Phos 3.1 mg/dL 08-05 Alb 4.3 g/dL 08-06 [id number] 13.4 %<H> 08-08 Chol 179 mg/dL  mg/dL HDL 31 mg/dL<L> Trig 98 mg/dL  CAPILLARY BLOOD GLUCOSE  POCT Blood Glucose.: 271 mg/dL (08 Aug 2019 11:53)  POCT Blood Glucose.: 242 mg/dL (08 Aug 2019 08:38)  POCT Blood Glucose.: 290 mg/dL (07 Aug 2019 21:47)  POCT Blood Glucose.: 375 mg/dL (07 Aug 2019 17:47)

## 2019-08-08 NOTE — DIETITIAN INITIAL EVALUATION ADULT. - OTHER INFO
RD consulted for education.  Admitted for chest pain.  PMH significant for HgbA1c 13.4% 8/6/19. Nutrition education provided on consistent CHO & DASH/TLC (cholesterol and sodium restricted) diet recommendations with focus on identifying carbohydrate foods.  Patient was able to teach back various carbohydrate foods.  Patient stated he generally eats only two meals per day (bfst and dinner) and skips lunch; encouraged patient to consume 3 meals per day.  Also encouraged consistent blood glucose monitoring. RD consulted for education.  Admitted for chest pain.  PMH significant for HTN, CAD, DM with HgbA1c 13.4% 8/6/19. Nutrition education provided on consistent CHO & DASH/TLC (cholesterol and sodium restricted) diet recommendations with focus on identifying carbohydrate foods.  Patient was able to teach back various carbohydrate foods.  Patient stated he generally eats only two meals per day (bfst and dinner) and skips lunch; encouraged patient to consume 3 meals per day & pursue regular BG monitoring.  Patient stated his BG was better controlled, but after recent visit to [place], was not following usual meal intake patterns or monitoring BG regularly.  No reported food allergies.  Reported usual body weight of 180 pounds, consistent weight current weight 8/6/19 - 81.8kg / 180.3 pounds.  Denies nausea, vomiting, diarrhea, constipation at this time.  No reported chewing or swallowing difficulties.

## 2019-08-08 NOTE — DIETITIAN INITIAL EVALUATION ADULT. - ADD RECOMMEND
1) Monitor weight, PO intake, skin integrity 2) Encourage diet compliance, re-consult RD as needed for further education, however, patient was able to provide teach back on information discussed.

## 2019-08-08 NOTE — PROGRESS NOTE ADULT - SUBJECTIVE AND OBJECTIVE BOX
Cardiology Attending Progress Note    No new complaints this AM  Denies having chest pain, palpitations    Reviewed test results with patient --> no significant findings noted on TTE   No ischemia on NST    Telemetry no new events    Significantly elevated HgbA1C -- to receive DM2 teaching prior to discharge    Vital Signs Last 24 Hrs  T(C): 36.6 (08 Aug 2019 05:39), Max: 36.6 (07 Aug 2019 20:00)  T(F): 97.9 (08 Aug 2019 05:39), Max: 97.9 (08 Aug 2019 05:39)  HR: 69 (08 Aug 2019 05:39) (63 - 78)  BP: 132/72 (08 Aug 2019 05:39) (108/56 - 134/68)  BP(mean): --  RR: 17 (08 Aug 2019 05:39) (16 - 17)  SpO2: 98% (08 Aug 2019 05:39) (97% - 100%)    NAD, laying flat in bed  No JVD  Reg S1S2  CTAB  Soft abdomen NT ND  No edema    MEDICATIONS  (STANDING):  aspirin  chewable 81 milliGRAM(s) Oral daily  atorvastatin 80 milliGRAM(s) Oral at bedtime  clopidogrel Tablet 75 milliGRAM(s) Oral daily  dextrose 5%. 1000 milliLiter(s) (50 mL/Hr) IV Continuous <Continuous>  dextrose 50% Injectable 12.5 Gram(s) IV Push once  dextrose 50% Injectable 25 Gram(s) IV Push once  dextrose 50% Injectable 25 Gram(s) IV Push once  insulin glargine Injectable (LANTUS) 15 Unit(s) SubCutaneous at bedtime  insulin lispro (HumaLOG) corrective regimen sliding scale   SubCutaneous three times a day before meals  insulin lispro (HumaLOG) corrective regimen sliding scale   SubCutaneous at bedtime  insulin lispro Injectable (HumaLOG) 5 Unit(s) SubCutaneous three times a day before meals  losartan 25 milliGRAM(s) Oral daily  metoprolol tartrate 25 milliGRAM(s) Oral two times a day    MEDICATIONS  (PRN):  dextrose 40% Gel 15 Gram(s) Oral once PRN Blood Glucose LESS THAN 70 milliGRAM(s)/deciliter  glucagon  Injectable 1 milliGRAM(s) IntraMuscular once PRN Glucose LESS THAN 70 milligrams/deciliter                                 14.2   8.02  )-----------( 211      ( 08 Aug 2019 06:00 )             44.4     08-07    137  |  99  |  18  ----------------------------<  276<H>  4.5   |  23  |  0.62    Ca    9.6      07 Aug 2019 06:20  Phos  3.1     08-07  Mg     1.8     08-07        < from: Xray Chest 2 Views PA/Lat (19 @ 19:48) >    EXAM:  XR CHEST PA LAT 2V        PROCEDURE DATE:  Aug  5 2019         INTERPRETATION:    CLINICAL INDICATION: Chest pain.    TECHNIQUE: PA and lateral views of the chest.    COMPARISON: Chest x-ray 3/22/2019, 2015.    FINDINGS:     Cardiomediastinal silhouette is normal. Coronary stents are noted.   The lungs are clear.   There are no pleural effusions. No pneumothorax.  There is no acute osseous abnormality.     IMPRESSION:   Clear lungs.       ELIZABETH PRIEST M.D., RADIOLOGY RESIDENT  This document has been electronically signed.  CARLEY VICKERS M.D., ATTENDING RADIOLOGIST  This document has been electronically signed. Aug  6 2019  8:33AM            < from: Transthoracic Echocardiogram (19 @ 12:26) >    Patient name: NORMA HAZEL  YOB: 1955   Age: 64 (M)   MR#: [id number]  Study Date: 2019  Location[facility] StressSonographer: Jose Armando Farah RDCS  Study quality: Technically good  Referring Physician: Jean Pierre Damioc MD  BloodPressure: 128/89 mmHg  Height: 168 cm  Weight: 68 kg  BSA: 1.8 m2  ------------------------------------------------------------------------  PROCEDURE: Transthoracic echocardiogram with 2-D, M-Mode  and complete spectral and color flow Doppler.  INDICATION: Chest pain, unspecified (R07.9)  ------------------------------------------------------------------------  DIMENSIONS:  Dimensions:     Normal Values:  LA:     3.0 cm    2.0 - 4.0 cm  Ao:     3.2 cm    2.0 - 3.8 cm  SEPTUM: 0.7 cm    0.6 - 1.2cm  PWT:    0.7 cm    0.6 - 1.1 cm  LVIDd:  4.0 cm    3.0 - 5.6 cm  LVIDs:  2.6 cm    1.8 - 4.0 cm  Derived Variables:  LVMI: 44 g/m2  RWT: 0.35  Fractional short: 35 %  Ejection Fraction (Teicholtz): 65 %  ------------------------------------------------------------------------  OBSERVATIONS:  Mitral Valve: Mitral annular calcification, otherwise  normal mitral valve. Minimal mitral regurgitation.  Aortic Root: Normal aortic root.  Aortic Valve: Aortic valve leaflet morphology not well  visualized.  Left Atrium: Normal left atrium.  Left Ventricle: Normal left ventricular systolic function.  No segmental wall motion abnormalities. Normal left  ventricular internal dimensions and wall thicknesses.  Right Heart: Normal right atrium. Normal right ventricular  size and function. Normal tricuspid valve. Minimal  tricuspid regurgitation. Normal pulmonic valve.  Pericardium/PleuraNormal pericardium with no pericardial  effusion.  ------------------------------------------------------------------------  CONCLUSIONS:  1. Mitral annular calcification, otherwise normal mitral  valve. Minimal mitral regurgitation.  2. Normal left ventricular internal dimensions and wall  thicknesses.  3. Normal left ventricular systolic function. No segmental  wall motion abnormalities.  4. Normal right ventricular size and function.  ------------------------------------------------------------------------  Confirmed on  2019 - 14:20:41 by Mejia Ruby M.D.  ------------------------------------------------------------------------    < end of copied text >    < from: Nuclear Stress Test-Pharmacologic (19 @ 15:43) >    PATIENT: NORMA HAZEL  : 1955   AGE: 64 (M)   MR#: [id number]  STUDY DATE: 2019  LOCATION: [facility] PHYSICIAN(S): Jean Pierre Damico MD  FELLOW: MALLORY Ortiz PA  ------------------------------------------------------------------------  TYPE OF TEST: Stress Pharmacologic  INDICATION: Chest pain, unspecified (R07.9)  ------------------------------------------------------------------------  HISTORY:  CARDIAC HISTORY: 64 year old male with past medical  history of CAD with stent (), Hypertension, Diabetes  mellitus, hyperlipidemia.  RISK FACTORS: Diabetes, Elevated Cholesterol, Hypertension  MEDICATIONS: metoprolol, aspirin, plavix, atorvastatin,  metformin, losartan  ------------------------------------------------------------------------  BASELINE ELECTROCARDIOGRAM:  Rhythm: Normal Sinus Rhythm - 67 BPM  Conduction Defect: RBBB  ------------------------------------------------------------------------  HEMODYNAMIC PARAMETERS:                                      HR   BP  Baseline  Pre-TM Start              69  123/59  01:30     Inject Regadenoson on TM  96  02:00     Post Injection on TM      96  127/65  03:00     Post Injection on TM      93  109/67  04:00     Post Injection on TM      88  05:00     Post Injection on TM      87  111/58  06:00     Recovery                  86  07:00     Recovery                  83   95/64  08:00     Recovery                  86   94/55  09:00     Recovery                  86  10:00     Recovery                  84  12:00   Recovery                  83   92/52  15:00     Recovery                  80   98/51  ------------------------------------------------------------------------  Agent: Regadenoson 0.4 mg/5 ml NS. injected over 10 sec.  Aminophylline: 75 mg  HR: Baseline HR: 69 bpm   Peak HR: 114 bpm (73% of MPHR)  MPHR: 157 bpm   85% of MPHR: 133 bpm  BP: Baseline BP: 123/59 mmHg   Peak BP: 127/65 mmHg   Peak  RPP: [id number] (Rate Pressure Product)  Last Caffeine intake: 12 hrs  Terminated: Completion of protocol  Comments: Exercised 7:09 Bill protocol achieving peak HR  114 bpm (73% MPHR), blunted due to beta blocker. No CP, no  ST depression and no arrhythmia with exercise.  ------------------------------------------------------------------------  SYMPTOMS/FINDINGS:  Symptoms: Fatigue  Chest pain: No chest pain with administration of  Regadenoson  Treatment: 75 mg Aminophylline was given at 11 min of post  infusion for hypotension  ------------------------------------------------------------------------  ECG ABNORMALITIES DURING/AFTER STRESS:   ST Changes:ST Depression: 2 mm upsloping in leads I, II,  AVF,V4, V5, V6 started at 02:00 min of infusion and  persisted 12:00 min into post infusion.  ST Elevation: 2 mm down sloping in leads aVR,V1 started at  02:00 min of  and persisted 12:00 min into .  ------------------------------------------------------------------------  NEW ARRHYTHMIAS DEVELOPED DURING/AFTER STRESS:  None  ------------------------------------------------------------------------  STRESS TEST IMPRESSIONS:   73% of MPHR.  Chest Pain: No chest pain with administration of  Regadenoson.  Symptom: Fatigue.  HR Response: Appropriate.  BP Response: Appropriate.  Heart Rhythm: Normal Sinus Rhythm - 67 BPM.  Conduction defects: RBBB.  ECG Changes: ST Depression: 2 mm upsloping in leads I, II,  AVF,V4, V5, V6 started at 02:00 min of infusion and  persisted 12:00 min into post infusion.  ST Elevation: 2 mm down sloping in leads aVR,V1 started at  02:00 min of  and persisted 12:00 min into .  Arrhythmia: None.  NOTE: ST-segment depression occurred with regadenoson, but  not with exercise. There was no chest pain with exercise.  ------------------------------------------------------------------------  PROCEDURE:  7.9 mCi of Tc 99m Tetrofosmin were injected during stress  protocol. Approximately 45 minutes later, tomographic  images were obtained in a 180 degree arc from right  anterior oblique to left anterior oblique with 64 stops.  The tomographic slices were reconstructed in 3 orthogonal  planes (short axis, horizontal long axis and vertical long  axis).  Interpretation was performed both by visual and  quantitative analysis.  Stress images were acquired using CZT-based system with  pinhole collimation ([employer]uo 530 c, [employer]),  and reconstructed using MLEM algorithm. Images were  re-acquired with the patient in a prone position.  ------------------------------------------------------------------------  NUCLEAR FINDINGS:  Review of raw data shows: The studyis of good technical  quality.  The left ventricle was normal in size. Normal myocardial  perfusion scan,with no evidence of infarction or inducible  ischemia.  ------------------------------------------------------------------------  GATED ANALYSIS:  Post-stress gated wall motion analysis was performed (LVEF  = 51 %;LVEDV = 72 ml.), revealing normal LV function. RV  function appeared normal.  ------------------------------------------------------------------------  IMPRESSIONS:Normal Study  * Myocardial Perfusion SPECT results are normal.  * Review of raw data shows: The study is of good technical  quality.  * The left ventricle was normal in size. Normal myocardial  perfusion scan,with no evidence of infarction or inducible  ischemia.  * Post-stress gated wall motion analysis was performed  (LVEF = 51 %;LVEDV = 72 ml.), revealing normal LV  function. RV function appeared normal.  ------------------------------------------------------------------------  Confirmed on  2019 - 17:14:27 by Balwinder Germain M.D.  ------------------------------------------------------------------------    < end of copied text >

## 2019-08-28 ENCOUNTER — APPOINTMENT (OUTPATIENT)
Dept: ENDOCRINOLOGY | Facility: CLINIC | Age: 64
End: 2019-08-28

## 2019-09-05 ENCOUNTER — EMERGENCY (EMERGENCY)
Facility: HOSPITAL | Age: 64
LOS: 0 days | Discharge: HOME | End: 2019-09-05
Attending: EMERGENCY MEDICINE
Payer: COMMERCIAL

## 2019-09-05 VITALS
RESPIRATION RATE: 16 BRPM | OXYGEN SATURATION: 98 % | DIASTOLIC BLOOD PRESSURE: 86 MMHG | TEMPERATURE: 98 F | HEIGHT: 65 IN | HEART RATE: 80 BPM | WEIGHT: 179.9 LBS | SYSTOLIC BLOOD PRESSURE: 140 MMHG

## 2019-09-05 VITALS
DIASTOLIC BLOOD PRESSURE: 88 MMHG | SYSTOLIC BLOOD PRESSURE: 157 MMHG | TEMPERATURE: 98 F | OXYGEN SATURATION: 98 % | RESPIRATION RATE: 17 BRPM | HEART RATE: 75 BPM

## 2019-09-05 DIAGNOSIS — S00.03XA CONTUSION OF SCALP, INITIAL ENCOUNTER: ICD-10-CM

## 2019-09-05 DIAGNOSIS — S30.1XXA CONTUSION OF ABDOMINAL WALL, INITIAL ENCOUNTER: ICD-10-CM

## 2019-09-05 DIAGNOSIS — I25.10 ATHEROSCLEROTIC HEART DISEASE OF NATIVE CORONARY ARTERY WITHOUT ANGINA PECTORIS: ICD-10-CM
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
## 2019-09-05 DIAGNOSIS — I10 ESSENTIAL (PRIMARY) HYPERTENSION: ICD-10-CM

## 2019-09-05 DIAGNOSIS — S20.212A CONTUSION OF LEFT FRONT WALL OF THORAX, INITIAL ENCOUNTER: ICD-10-CM

## 2019-09-05 DIAGNOSIS — R07.9 CHEST PAIN, UNSPECIFIED: ICD-10-CM

## 2019-09-05 DIAGNOSIS — M54.2 CERVICALGIA: ICD-10-CM
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
## 2019-09-05 DIAGNOSIS — I45.10 UNSPECIFIED RIGHT BUNDLE-BRANCH BLOCK: ICD-10-CM

## 2019-09-05 DIAGNOSIS — R51 HEADACHE: ICD-10-CM

## 2019-09-05 LAB
ALBUMIN SERPL ELPH-MCNC: 3.8 G/DL — SIGNIFICANT CHANGE UP (ref 3.3–5)
ALP SERPL-CCNC: 104 U/L — SIGNIFICANT CHANGE UP (ref 40–120)
ALT FLD-CCNC: 39 U/L — SIGNIFICANT CHANGE UP (ref 12–78)
AMYLASE P1 CFR SERPL: 22 U/L — LOW (ref 25–115)
ANION GAP SERPL CALC-SCNC: 7 MMOL/L — SIGNIFICANT CHANGE UP (ref 5–17)
APTT BLD: 29.5 SEC — SIGNIFICANT CHANGE UP (ref 27.5–36.3)
AST SERPL-CCNC: 23 U/L — SIGNIFICANT CHANGE UP (ref 15–37)
BASOPHILS # BLD AUTO: 0.07 K/UL — SIGNIFICANT CHANGE UP (ref 0–0.2)
BASOPHILS NFR BLD AUTO: 0.8 % — SIGNIFICANT CHANGE UP (ref 0–2)
BILIRUB SERPL-MCNC: 0.4 MG/DL — SIGNIFICANT CHANGE UP (ref 0.2–1.2)
BUN SERPL-MCNC: 14 MG/DL — SIGNIFICANT CHANGE UP (ref 7–23)
CALCIUM SERPL-MCNC: 8.9 MG/DL — SIGNIFICANT CHANGE UP (ref 8.5–10.1)
CHLORIDE SERPL-SCNC: 101 MMOL/L — SIGNIFICANT CHANGE UP (ref 96–108)
CO2 SERPL-SCNC: 28 MMOL/L — SIGNIFICANT CHANGE UP (ref 22–31)
CREAT SERPL-MCNC: 0.88 MG/DL — SIGNIFICANT CHANGE UP (ref 0.5–1.3)
EOSINOPHIL # BLD AUTO: 0.2 K/UL — SIGNIFICANT CHANGE UP (ref 0–0.5)
EOSINOPHIL NFR BLD AUTO: 2.3 % — SIGNIFICANT CHANGE UP (ref 0–6)
GLUCOSE SERPL-MCNC: 259 MG/DL — HIGH (ref 70–99)
HCT VFR BLD CALC: 41.1 % — SIGNIFICANT CHANGE UP (ref 39–50)
HGB BLD-MCNC: 13.6 G/DL — SIGNIFICANT CHANGE UP (ref 13–17)
IMM GRANULOCYTES NFR BLD AUTO: 0.9 % — SIGNIFICANT CHANGE UP (ref 0–1.5)
INR BLD: 1.05 RATIO — SIGNIFICANT CHANGE UP (ref 0.88–1.16)
LIDOCAIN IGE QN: 214 U/L — SIGNIFICANT CHANGE UP (ref 73–393)
LYMPHOCYTES # BLD AUTO: 2.47 K/UL — SIGNIFICANT CHANGE UP (ref 1–3.3)
LYMPHOCYTES # BLD AUTO: 28.8 % — SIGNIFICANT CHANGE UP (ref 13–44)
MCHC RBC-ENTMCNC: 26.4 PG — LOW (ref 27–34)
MCHC RBC-ENTMCNC: 33.1 GM/DL — SIGNIFICANT CHANGE UP (ref 32–36)
MCV RBC AUTO: 79.7 FL — LOW (ref 80–100)
MONOCYTES # BLD AUTO: 0.49 K/UL — SIGNIFICANT CHANGE UP (ref 0–0.9)
MONOCYTES NFR BLD AUTO: 5.7 % — SIGNIFICANT CHANGE UP (ref 2–14)
NEUTROPHILS # BLD AUTO: 5.26 K/UL — SIGNIFICANT CHANGE UP (ref 1.8–7.4)
NEUTROPHILS NFR BLD AUTO: 61.5 % — SIGNIFICANT CHANGE UP (ref 43–77)
NRBC # BLD: 0 /100 WBCS — SIGNIFICANT CHANGE UP (ref 0–0)
PLATELET # BLD AUTO: 232 K/UL — SIGNIFICANT CHANGE UP (ref 150–400)
POTASSIUM SERPL-MCNC: 4.3 MMOL/L — SIGNIFICANT CHANGE UP (ref 3.5–5.3)
POTASSIUM SERPL-SCNC: 4.3 MMOL/L — SIGNIFICANT CHANGE UP (ref 3.5–5.3)
PROT SERPL-MCNC: 7.7 GM/DL — SIGNIFICANT CHANGE UP (ref 6–8.3)
PROTHROM AB SERPL-ACNC: 11.8 SEC — SIGNIFICANT CHANGE UP (ref 10–12.9)
RBC # BLD: 5.16 M/UL — SIGNIFICANT CHANGE UP (ref 4.2–5.8)
RBC # FLD: 13.4 % — SIGNIFICANT CHANGE UP (ref 10.3–14.5)
SODIUM SERPL-SCNC: 136 MMOL/L — SIGNIFICANT CHANGE UP (ref 135–145)
TROPONIN I SERPL-MCNC: <.015 NG/ML — SIGNIFICANT CHANGE UP (ref 0.01–0.04)
WBC # BLD: 8.57 K/UL — SIGNIFICANT CHANGE UP (ref 3.8–10.5)
WBC # FLD AUTO: 8.57 K/UL — SIGNIFICANT CHANGE UP (ref 3.8–10.5)

## 2019-09-05 PROCEDURE — 72125 CT NECK SPINE W/O DYE: CPT | Mod: 26

## 2019-09-05 PROCEDURE — 99285 EMERGENCY DEPT VISIT HI MDM: CPT

## 2019-09-05 PROCEDURE — 93010 ELECTROCARDIOGRAM REPORT: CPT

## 2019-09-05 PROCEDURE — 71045 X-RAY EXAM CHEST 1 VIEW: CPT | Mod: 26

## 2019-09-05 PROCEDURE — 76377 3D RENDER W/INTRP POSTPROCES: CPT | Mod: 26

## 2019-09-05 PROCEDURE — 74177 CT ABD & PELVIS W/CONTRAST: CPT | Mod: 26

## 2019-09-05 PROCEDURE — 70450 CT HEAD/BRAIN W/O DYE: CPT | Mod: 26

## 2019-09-05 RX ORDER — OXYCODONE AND ACETAMINOPHEN 5; 325 MG/1; MG/1
1 TABLET ORAL ONCE
Refills: 0 | Status: DISCONTINUED | OUTPATIENT
Start: 2019-09-05 | End: 2019-09-05

## 2019-09-05 RX ADMIN — OXYCODONE AND ACETAMINOPHEN 1 TABLET(S): 5; 325 TABLET ORAL at 18:57

## 2019-09-05 NOTE — ED PROVIDER NOTE - PATIENT PORTAL LINK FT
You can access the FollowMyHealth Patient Portal offered by [facility] by registering at the following website: [email or web address]. By joining [facility]’s FollowMyHealth portal, you will also be able to view your health information using other applications (apps) compatible with our system.

## 2019-09-05 NOTE — ED PROVIDER NOTE - NONTENDER LOCATION
left lower quadrant/umbilical/suprapubic/right costovertebral angle/right lower quadrant/periumbilical/left costovertebral angle/left upper quadrant
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
## 2019-09-05 NOTE — ED PROVIDER NOTE - MUSCULOSKELETAL NECK EXAM
PAIN ON MOVEMENT/trachea midline/No vertebral point tenderness/supple/no deformity, pain or tenderness. no restriction of movement

## 2019-09-05 NOTE — ED PROVIDER NOTE - CONSTITUTIONAL, MLM
normal... Well appearing, well nourished, awake, alert, oriented to person, place, time/situation and in no apparent distress. Speaking in clear full sentences no nasal flaring no shoulders retractions no diaphoresis, not holding his head/chest/abdomen, appears very comfortable sitting up in the stretcher in a bright light room.
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
## 2019-09-05 NOTE — ED ADULT NURSE NOTE - ED STAT RN HANDOFF DETAILS
Report received from PONCHO Stoner at 7pm. Assessment available on [facility]. will continue to monitor
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
## 2019-09-05 NOTE — ED PROVIDER NOTE - CARE PLAN
Principal Discharge DX:	Contusion of scalp, initial encounter  Secondary Diagnosis:	Contusion of chest wall, left, initial encounter  Secondary Diagnosis:	Contusion of abdominal wall, initial encounter

## 2019-09-10 ENCOUNTER — APPOINTMENT (OUTPATIENT)
Dept: CARDIOLOGY | Facility: HOSPITAL | Age: 64
End: 2019-09-10

## 2019-10-22 ENCOUNTER — APPOINTMENT (OUTPATIENT)
Dept: ENDOCRINOLOGY | Facility: CLINIC | Age: 64
End: 2019-10-22

## 2020-11-27 NOTE — PROGRESS NOTE ADULT - PROBLEM SELECTOR PROBLEM 1
Chest pain
Chest pain
Type 2 diabetes mellitus with hyperglycemia, without long-term current use of insulin
27-Nov-2020 20:30

## 2021-09-27 NOTE — H&P ADULT - PROBLEM SELECTOR PROBLEM 2
CAD (coronary artery disease) pt cooperative with no complaints.   results noted.   will d/c back to NH

## 2022-01-09 ENCOUNTER — EMERGENCY (EMERGENCY)
Facility: HOSPITAL | Age: 67
LOS: 1 days | Discharge: HOME | End: 2022-01-09
Attending: EMERGENCY MEDICINE | Admitting: EMERGENCY MEDICINE
Payer: MEDICARE

## 2022-01-09 VITALS
SYSTOLIC BLOOD PRESSURE: 138 MMHG | RESPIRATION RATE: 20 BRPM | HEIGHT: 65 IN | OXYGEN SATURATION: 99 % | HEART RATE: 90 BPM | TEMPERATURE: 99 F | DIASTOLIC BLOOD PRESSURE: 80 MMHG

## 2022-01-09 VITALS
DIASTOLIC BLOOD PRESSURE: 67 MMHG | HEART RATE: 83 BPM | OXYGEN SATURATION: 100 % | SYSTOLIC BLOOD PRESSURE: 157 MMHG | RESPIRATION RATE: 18 BRPM

## 2022-01-09 PROBLEM — I25.10 ATHEROSCLEROTIC HEART DISEASE OF NATIVE CORONARY ARTERY WITHOUT ANGINA PECTORIS: Chronic | Status: ACTIVE | Noted: 2019-09-05

## 2022-01-09 PROBLEM — I10 ESSENTIAL (PRIMARY) HYPERTENSION: Chronic | Status: ACTIVE | Noted: 2019-09-05

## 2022-01-09 PROBLEM — E11.9 TYPE 2 DIABETES MELLITUS WITHOUT COMPLICATIONS: Chronic | Status: ACTIVE | Noted: 2019-09-05

## 2022-01-09 LAB
ALBUMIN SERPL ELPH-MCNC: 4.1 G/DL — SIGNIFICANT CHANGE UP (ref 3.3–5)
ALP SERPL-CCNC: 87 U/L — SIGNIFICANT CHANGE UP (ref 40–120)
ALT FLD-CCNC: 31 U/L — SIGNIFICANT CHANGE UP (ref 4–41)
ANION GAP SERPL CALC-SCNC: 11 MMOL/L — SIGNIFICANT CHANGE UP (ref 7–14)
AST SERPL-CCNC: 24 U/L — SIGNIFICANT CHANGE UP (ref 4–40)
BASOPHILS # BLD AUTO: 0.02 K/UL — SIGNIFICANT CHANGE UP (ref 0–0.2)
BASOPHILS NFR BLD AUTO: 0.5 % — SIGNIFICANT CHANGE UP (ref 0–2)
BILIRUB SERPL-MCNC: 0.3 MG/DL — SIGNIFICANT CHANGE UP (ref 0.2–1.2)
BUN SERPL-MCNC: 16 MG/DL — SIGNIFICANT CHANGE UP (ref 7–23)
CALCIUM SERPL-MCNC: 9.4 MG/DL — SIGNIFICANT CHANGE UP (ref 8.4–10.5)
CHLORIDE SERPL-SCNC: 97 MMOL/L — LOW (ref 98–107)
CO2 SERPL-SCNC: 26 MMOL/L — SIGNIFICANT CHANGE UP (ref 22–31)
CREAT SERPL-MCNC: 0.82 MG/DL — SIGNIFICANT CHANGE UP (ref 0.5–1.3)
EOSINOPHIL # BLD AUTO: 0.07 K/UL — SIGNIFICANT CHANGE UP (ref 0–0.5)
EOSINOPHIL NFR BLD AUTO: 1.6 % — SIGNIFICANT CHANGE UP (ref 0–6)
GLUCOSE SERPL-MCNC: 281 MG/DL — HIGH (ref 70–99)
HCT VFR BLD CALC: 44.2 % — SIGNIFICANT CHANGE UP (ref 39–50)
HGB BLD-MCNC: 14.8 G/DL — SIGNIFICANT CHANGE UP (ref 13–17)
IANC: 2.41 K/UL — SIGNIFICANT CHANGE UP (ref 1.5–8.5)
IMM GRANULOCYTES NFR BLD AUTO: 0.7 % — SIGNIFICANT CHANGE UP (ref 0–1.5)
LYMPHOCYTES # BLD AUTO: 1.4 K/UL — SIGNIFICANT CHANGE UP (ref 1–3.3)
LYMPHOCYTES # BLD AUTO: 31.7 % — SIGNIFICANT CHANGE UP (ref 13–44)
MCHC RBC-ENTMCNC: 26.4 PG — LOW (ref 27–34)
MCHC RBC-ENTMCNC: 33.5 GM/DL — SIGNIFICANT CHANGE UP (ref 32–36)
MCV RBC AUTO: 78.8 FL — LOW (ref 80–100)
MONOCYTES # BLD AUTO: 0.49 K/UL — SIGNIFICANT CHANGE UP (ref 0–0.9)
MONOCYTES NFR BLD AUTO: 11.1 % — SIGNIFICANT CHANGE UP (ref 2–14)
NEUTROPHILS # BLD AUTO: 2.41 K/UL — SIGNIFICANT CHANGE UP (ref 1.8–7.4)
NEUTROPHILS NFR BLD AUTO: 54.4 % — SIGNIFICANT CHANGE UP (ref 43–77)
NRBC # BLD: 0 /100 WBCS — SIGNIFICANT CHANGE UP
NRBC # FLD: 0 K/UL — SIGNIFICANT CHANGE UP
PLATELET # BLD AUTO: 162 K/UL — SIGNIFICANT CHANGE UP (ref 150–400)
POTASSIUM SERPL-MCNC: 5.3 MMOL/L — SIGNIFICANT CHANGE UP (ref 3.5–5.3)
POTASSIUM SERPL-SCNC: 5.3 MMOL/L — SIGNIFICANT CHANGE UP (ref 3.5–5.3)
PROT SERPL-MCNC: 6.9 G/DL — SIGNIFICANT CHANGE UP (ref 6–8.3)
RBC # BLD: 5.61 M/UL — SIGNIFICANT CHANGE UP (ref 4.2–5.8)
RBC # FLD: 13.1 % — SIGNIFICANT CHANGE UP (ref 10.3–14.5)
SODIUM SERPL-SCNC: 134 MMOL/L — LOW (ref 135–145)
WBC # BLD: 4.42 K/UL — SIGNIFICANT CHANGE UP (ref 3.8–10.5)
WBC # FLD AUTO: 4.42 K/UL — SIGNIFICANT CHANGE UP (ref 3.8–10.5)

## 2022-01-09 PROCEDURE — G1004: CPT

## 2022-01-09 PROCEDURE — 99285 EMERGENCY DEPT VISIT HI MDM: CPT

## 2022-01-09 PROCEDURE — 74177 CT ABD & PELVIS W/CONTRAST: CPT | Mod: 26,MG

## 2022-01-09 RX ORDER — KETOROLAC TROMETHAMINE 30 MG/ML
15 SYRINGE (ML) INJECTION ONCE
Refills: 0 | Status: DISCONTINUED | OUTPATIENT
Start: 2022-01-09 | End: 2022-01-09

## 2022-01-09 RX ADMIN — Medication 15 MILLIGRAM(S): at 20:02

## 2022-01-09 NOTE — ED ADULT NURSE NOTE - OBJECTIVE STATEMENT
pt Aox4 came in c/o cough since few days . NAd. breathing even and unlabored. skin warm and  dry. labs done and meds given as ordered. awaiting results and re eval.

## 2022-01-09 NOTE — ED ADULT NURSE NOTE - NSIMPLEMENTINTERV_GEN_ALL_ED
Implemented All Universal Safety Interventions:  [place] to call system. Call bell, personal items and telephone within reach. Instruct patient to call for assistance. Room bathroom lighting operational. Non-slip footwear when patient is off stretcher. Physically safe environment: no spills, clutter or unnecessary equipment. Stretcher in lowest position, wheels locked, appropriate side rails in place.

## 2022-01-09 NOTE — ED PROVIDER NOTE - CONDITION AT DISCHARGE:
Improved Complex Repair And Xenograft Text: The defect edges were debeveled with a #15 scalpel blade.  The primary defect was closed partially with a complex linear closure.  Given the location of the defect, shape of the defect and the proximity to free margins a xenograft was deemed most appropriate to repair the remaining defect.  The graft was trimmed to fit the size of the remaining defect.  The graft was then placed in the primary defect, oriented appropriately, and sutured into place.

## 2022-01-09 NOTE — ED PROVIDER NOTE - NSICDXPASTMEDICALHX_GEN_ALL_CORE_FT
PAST MEDICAL HISTORY:  CAD (coronary artery disease)     CAD (coronary artery disease) two stents    Diabetes     DM (diabetes mellitus)     Hypertension     Hypertension

## 2022-01-09 NOTE — ED PROVIDER NOTE - CLINICAL SUMMARY MEDICAL DECISION MAKING FREE TEXT BOX
Pt c complex PMHx, covid vaccinated, who p/w midl abd pain, diarrhea, mild cough.  Is TTP and mild guarding to palp at right mid abd, but otherwise well appearing.  Occasional cough. Suspect covid vs other viral sydnrome.  Given TTP will CTAP as well to r/o appy vs atypical divertic. Reassess.

## 2022-01-09 NOTE — ED PROVIDER NOTE - CARE PLAN
1 Principal Discharge DX:	Abdominal pain   Principal Discharge DX:	Abdominal pain  Secondary Diagnosis:	COVID-19

## 2022-01-09 NOTE — ED PROVIDER NOTE - PATIENT PORTAL LINK FT
You can access the FollowMyHealth Patient Portal offered by [facility] by registering at the following website: [email or web address]. By joining [facility]’s FollowMyHealth portal, you will also be able to view your health information using other applications (apps) compatible with our system.

## 2022-01-09 NOTE — ED ADULT NURSE NOTE - NS_SISCREENINGSR_GEN_ALL_ED
[General Appearance - Well Developed] : well developed [General Appearance - Well Nourished] : well nourished [Normal Appearance] : normal appearance [Well Groomed] : well groomed [General Appearance - In No Acute Distress] : no acute distress [Abdomen Soft] : soft [Abdomen Tenderness] : non-tender [Costovertebral Angle Tenderness] : no ~M costovertebral angle tenderness [Urethral Meatus] : meatus normal [Penis Abnormality] : normal circumcised penis [Rectal Exam - Seminal Vesicles] : the seminal vesicles were normal [Epididymis] : the epididymides were normal [] : no respiratory distress [Exaggerated Use Of Accessory Muscles For Inspiration] : no accessory muscle use [Respiration, Rhythm And Depth] : normal respiratory rhythm and effort [Oriented To Time, Place, And Person] : oriented to person, place, and time [Affect] : the affect was normal [Mood] : the mood was normal [Not Anxious] : not anxious [Normal Station and Gait] : the gait and station were normal for the patient's age [No Focal Deficits] : no focal deficits [FreeTextEntry1] : s/p L inguinal hernia repair; L high riding testicle vas palpable bilaterally  Negative

## 2022-01-09 NOTE — ED ADULT TRIAGE NOTE - CHIEF COMPLAINT QUOTE
pt Vacc. with J&J coming with non prod. cough started today, denies SOb, no fever, no HA, denies exposure.

## 2022-01-09 NOTE — ED PROVIDER NOTE - OBJECTIVE STATEMENT
66 yr old M c CAD/stent, DM, HTN, HLD who p/w abd pain,diarrhea, some cough. Is vaccinated for covid.  no sick contacts.  All symptoms started today.  Is otherwise well. Normal PO intake.  No vomiting.  No fever/chills. . no CP or SOB.

## 2022-01-10 LAB — SARS-COV-2 RNA SPEC QL NAA+PROBE: DETECTED

## 2022-03-05 NOTE — ED PROVIDER NOTE - PROGRESS NOTE DETAILS
testicular pain
pt is feeling much better no focal neuro deficits on exam. Pt is given and explained all test reports and advised to follow up with his doctor as soon as possible return if symptoms persist or worsen.

## 2022-04-30 NOTE — ED PROVIDER NOTE - NSCAREINITIATED _GEN_ER
K 5.7 on admission   ECG: NSR, no peaked t waves  Chest pain/discomfort: none  Home medications: no ARBs, or ACEi    Plan:   -[facility] cardiology if K >5.5 with ECG changes or nephrology if >5.5 (persistently) and anuric  -Trend BMP  -Telemetry   -Shift with lasix, duonebs, lokelma PRN. Be cautious about using insulin with dextrose in setting of hypoglycemia       Mitch Willis(Attending)

## 2024-04-26 NOTE — ED ADULT NURSE NOTE - CHIEF COMPLAINT
The patient is a 66y Male complaining of cough. Is This A New Presentation, Or A Follow-Up?: Skin Lesion How Severe Is Your Skin Lesion?: moderate Has Your Skin Lesion Been Treated?: not been treated
